# Patient Record
Sex: FEMALE | Race: ASIAN | Employment: OTHER | URBAN - METROPOLITAN AREA
[De-identification: names, ages, dates, MRNs, and addresses within clinical notes are randomized per-mention and may not be internally consistent; named-entity substitution may affect disease eponyms.]

---

## 2017-01-16 ENCOUNTER — OFFICE VISIT (OUTPATIENT)
Dept: FAMILY MEDICINE | Facility: CLINIC | Age: 67
End: 2017-01-16

## 2017-01-16 VITALS
WEIGHT: 128.8 LBS | BODY MASS INDEX: 27.79 KG/M2 | RESPIRATION RATE: 16 BRPM | HEART RATE: 53 BPM | HEIGHT: 57 IN | OXYGEN SATURATION: 99 % | DIASTOLIC BLOOD PRESSURE: 91 MMHG | SYSTOLIC BLOOD PRESSURE: 165 MMHG | TEMPERATURE: 97.6 F

## 2017-01-16 DIAGNOSIS — R00.2 PALPITATIONS: Primary | ICD-10-CM

## 2017-01-16 DIAGNOSIS — R03.0 ELEVATED BLOOD PRESSURE READING WITHOUT DIAGNOSIS OF HYPERTENSION: ICD-10-CM

## 2017-01-16 DIAGNOSIS — R00.1 BRADYCARDIA: ICD-10-CM

## 2017-01-16 LAB
ANION GAP SERPL CALCULATED.3IONS-SCNC: 11 MMOL/L (ref 5–18)
BUN SERPL-MCNC: 14 MG/DL (ref 8–22)
CALCIUM SERPL-MCNC: 9.7 MG/DL (ref 8.5–10.5)
CHLORIDE SERPL-SCNC: 107 MMOL/L (ref 98–107)
CO2 SERPL-SCNC: 25 MMOL/L (ref 22–31)
CREAT SERPL-MCNC: 0.82 MG/DL (ref 0.6–1.1)
GLUCOSE SERPL-MCNC: 91 MG/DL (ref 70–125)
MAGNESIUM SERPL-MCNC: 2.2 MG/DL (ref 1.8–2.6)
POTASSIUM SERPL-SCNC: 4.4 MMOL/L (ref 3.5–5)
SODIUM SERPL-SCNC: 143 MMOL/L (ref 136–145)
TSH SERPL DL<=0.05 MIU/L-ACNC: 2 UIU/ML (ref 0.3–5)

## 2017-01-16 NOTE — PROGRESS NOTES
"SUBJECTIVE:  This is a 66-year-old English and Hmong-speaking female who presents with an episode of \"racing heart\" last Friday evening.  Two days prior to today's visit she experienced 30 minutes of heart racing, which prompted her evaluation today.  She describes being in her usual state of good health all day on Friday.  On Friday evening  she met with her 2 sisters at her sister's home in [place] for a bible study and pray session which is part of their typical routine.  She did nothing unusual during the day.  She ate a light evening meal of the spring rolls and water.  She has had no caffeine during the course of the day.  She went home late Friday evening to her home in [place], when she got home she went to bed was in bed briefly and found her feet were cold so got out of bed and walked a few steps to the dresser to put his socks on and got back into bed and pulled the covers up.  She then felt her heart begin to \"pound\" and race.  She did not measure her heart rate.  She put her hand on her chest and felt it was going \"fast\" and \"hard.\"  She began praying and letting god know that if it was her time to die that she was ready but otherwise to help her.  She noticed some tightening sensation at the back of her neck during these symptoms, but otherwise no symptoms.  She was not short of breath.  She had no radiating pain.  No jaw pain and no dizziness, lightheadedness or vertigo.  She stayed in bed for approximately 30 minutes.  She drifted off to sleep.  She cannot recall whether the symptoms were completely gone before she fell asleep or not.  When she woke up she felt perfectly fine.  She has not had symptoms since that time.  She has not had similar symptoms in the past.  She has been able to go about her daily activities over the weekend without any difficulty.  She typically can do as much as she wants physically only limited by some \"leg stiffness\" which happens after she climbs up more than 1 flight " or 2 of stairs.  She does not get short of breath, dizzy, lightheaded or chest pain with physical exertion.  She has not measured her heart rate in the past.   REVIEW OF SYSTEMS:  She has not had recent illness.  She has no respiratory symptoms, no headache or visual changes, no stomach upset, bowel changes, no urine changes.  No bleeding or bruising.  No lumps or bumps.  No muscle aches.  The remainder of the review of systems as outlined above.   SOCIAL HISTORY:  She does not drink alcohol.  She does not smoke.   PHYSICAL EXAMINATION:   VITAL SIGNS:  As above with her blood pressure above goal.  (in reviewing past blood pressures is significantly lower in the 115-120 range systolic).   HEENT:  Head is normocephalic and atraumatic.  Eyes, sclerae are nonicteric, noninjected.  Extraocular movements are intact.  Tympanic membranes are normal with normal bony and light landmarks.  Moist mucous membranes.   NECK:  Supple without lymphadenopathy.   CARDIOVASCULAR:  Regular rate and rhythm without murmur.   LUNGS:  Clear to auscultation bilaterally.   ABDOMEN:  Soft, nontender.   EXTREMITIES:  Free of edema.   NEUROLOGIC:  Cranial nerves II-XII are grossly intact.  She ambulates about the room normally with a normal gait.   ASSESSMENT AND PLAN:   1.  Palpitations, 2 days ago.  She is currently asymptomatic.  EKG today shows a sinus bradycardia at 49 beats per minute, otherwise normal.  Her heart rate varied between 50 and 59 during our visit today.  No clear etiology of her palpitations last Friday or her bradycardia today.  I have sent a basic metabolic profile, magnesium and TSH.  Her bradycardia is likely asymptomatic and may not be related.  Will await laboratory evaluation.  I have also asked her to take note of any symptoms she may not have noticed previously when she does physical exertion, and to try to measure her heart rate if she has any recurrence of the racing heart.  We will discuss any new or recurrence of  symptoms when we discuss lab results by phone, likely in the next 48 hours.   2.  Hypertensive blood pressure readings without diagnosis of hypertension:  I have recommended she check her blood pressure twice a week over the next 2-3 weeks.  If her blood pressure systolic  greater than 140 or diastolic greater than 90 I recommend she call with her readings.   3.  Health care maintenance:  She had a normal Pap smear in 04/2016 and a mammogram documented in 2013.  Recommend a repeat mammogram if it has not been done more recently than 2015.  In reviewing her chart, I cannot find documented colon cancer screening.  She does have a DEXA scan order on file.

## 2017-01-16 NOTE — PATIENT INSTRUCTIONS
Your medication list is printed, please keep this with you, it is helpful to bring this current list to any other medical appointments, the emergency room or hospital.    If you had lab testing today and your results are reassuring or normal they will be be mailed to you within 7 days.     If the lab tests need quick action we will call you with the results.   The phone number we will call with results is # [phone] (home) . If this is not the best number please call our clinic and change the number.    If you need any refills please call your pharmacy and they will contact us.    If you have any further concerns or wish to schedule another appointment you must call our office during normal business hours  [phone] (8-5:00 M-F)  If you have urgent medical questions that cannot wait  you may also call [phone] at any time of day.  If you have a medical emergency please call [phone].    Thank you for coming to [facility].

## 2017-01-16 NOTE — MR AVS SNAPSHOT
After Visit Summary   1/16/2017    Sona Sanders    MRN: [id number]           Patient Information     Date Of Birth          1950        Visit Information        Provider Department      1/16/2017 11:00 AM Igor Mary MD [facility]        Today's Diagnoses     Palpitations    -  1     Bradycardia           Care Instructions          Your medication list is printed, please keep this with you, it is helpful to bring this current list to any other medical appointments, the emergency room or hospital.    If you had lab testing today and your results are reassuring or normal they will be be mailed to you within 7 days.     If the lab tests need quick action we will call you with the results.   The phone number we will call with results is # [phone] (home) . If this is not the best number please call our clinic and change the number.    If you need any refills please call your pharmacy and they will contact us.    If you have any further concerns or wish to schedule another appointment you must call our office during normal business hours  [phone] (8-5:00 M-F)  If you have urgent medical questions that cannot wait  you may also call [phone] at any time of day.  If you have a medical emergency please call [phone].    Thank you for coming to [facility].          Follow-ups after your visit        Who to contact     Please call your clinic at [phone] to:    Ask questions about your health    Make or cancel appointments    Discuss your medicines    Learn about your test results    Speak to your doctor   If you have compliments or concerns about an experience at your clinic, or if you wish to file a complaint, please contact [facility] Patient Relations at [phone] or email us at [email or web address]         Additional Information About Your Visit        Care EveryWhere ID     This is your Care EveryWhere ID. This could be used  "by other organizations to access your [place] medical records  [id number]        Your Vitals Were     Pulse Temperature Respirations Height BMI (Body Mass Index) Pulse Oximetry    53 97.6  F (36.4  C) (Oral) 16 4' 8.5\" (143.5 cm) 28.37 kg/m2 99%       Blood Pressure from Last 3 Encounters:   01/16/17 165/91   06/27/16 128/82   04/18/16 152/91    Weight from Last 3 Encounters:   01/16/17 128 lb 12.8 oz (58.423 kg)   06/27/16 128 lb (58.06 kg)   04/18/16 130 lb 6 oz (59.138 kg)              We Performed the Following     Basic Metabolic Profile ([facility]) - Results > 1 hr     EKG 12-lead complete w/read - Clinics     Magnesium ([facility])     Thyroid [place] ([facility])        Primary Care Provider Office Phone # Fax #    Renetta Pamela Crowell [phone]       [facility] [address] [place] [address]        Thank you!     Thank you for choosing [facility]  for your care. Our goal is always to provide you with excellent care. Hearing back from our patients is one way we can continue to improve our services. Please take a few minutes to complete the written survey that you may receive in the mail after your visit with us. Thank you!             Your Updated Medication List - Protect others around you: Learn how to safely use, store and throw away your medicines at www.disposemymeds.org.          This list is accurate as of: 1/16/17 12:17 PM.  Always use your most recent med list.                   Brand Name Dispense Instructions for use    ZANTAC PO      Take 1 tablet by mouth daily as needed         "

## 2017-01-16 NOTE — Clinical Note
January 17, 2017      Sona Sanders  [address] [address] [place] [address]        Dear Hope,    Your thyroid hormone test, blood salts, magnesium level, and kidney function are all NORMAL.  Return to clinic if you have any other episodes of heart racing or other new symptoms.    Please see below for your test results.    Resulted Orders   Thyroid [place] ([facility])   Result Value Ref Range    TSH 2.00 0.30 - 5.00 uIU/mL    Narrative    Test performed by:  [facility]  [address] [place] [address]   Basic Metabolic Profile ([facility]) - Results > 1 hr   Result Value Ref Range    Sodium 143 136 - 145 mmol/L    Potassium 4.4 3.5 - 5.0 mmol/L    Chloride 107 98 - 107 mmol/L    CO2, Total 25 22 - 31 mmol/L    Anion Gap 11 5 - 18 mmol/L    Glucose 91 70 - 125 mg/dL    Calcium 9.7 8.5 - 10.5 mg/dL    Urea Nitrogen 14 8 - 22 mg/dL    Creatinine 0.82 0.60 - 1.10 mg/dL    GFR Estimate If Black >60 >60 mL/min/1.73m2    GFR Estimate >60 >60 mL/min/1.73m2    Narrative    Test performed by:  [facility] [address] [place] [address]  Fasting Glucose reference range is 70-99 mg/dL per  American Diabetes Association (ADA) guidelines.   Magnesium ([facility])   Result Value Ref Range    Magnesium 2.2 1.8 - 2.6 mg/dL    Narrative    Test performed by:  [facility] [address] [place] [address]       If you have any questions, please call the clinic to make an appointment.    Sincerely,    Igor Mary MD

## 2017-01-17 NOTE — PROGRESS NOTES
Quick Note:    Please call results and recommendations  Your thyroid hormone test, blood salts, magnesium level, and kidney function are all NORMAL.  Return to clinic if you have any other episodes of heart racing or other new symptoms.  ______

## 2018-06-22 ENCOUNTER — OFFICE VISIT (OUTPATIENT)
Dept: FAMILY MEDICINE | Facility: CLINIC | Age: 68
End: 2018-06-22
Payer: MEDICARE

## 2018-06-22 VITALS
HEIGHT: 58 IN | HEART RATE: 54 BPM | TEMPERATURE: 97.6 F | DIASTOLIC BLOOD PRESSURE: 91 MMHG | OXYGEN SATURATION: 99 % | RESPIRATION RATE: 18 BRPM | SYSTOLIC BLOOD PRESSURE: 175 MMHG | BODY MASS INDEX: 27.54 KG/M2 | WEIGHT: 131.2 LBS

## 2018-06-22 DIAGNOSIS — Z71.84 TRAVEL ADVICE ENCOUNTER: ICD-10-CM

## 2018-06-22 DIAGNOSIS — Z63.6 CAREGIVER BURDEN: ICD-10-CM

## 2018-06-22 DIAGNOSIS — K21.9 GASTROESOPHAGEAL REFLUX DISEASE WITHOUT ESOPHAGITIS: Primary | ICD-10-CM

## 2018-06-22 RX ORDER — DOXYCYCLINE 100 MG/1
100 CAPSULE ORAL DAILY
Qty: 50 CAPSULE | Refills: 0 | Status: SHIPPED | OUTPATIENT
Start: 2018-06-22 | End: 2019-05-24

## 2018-06-22 SDOH — SOCIAL STABILITY - SOCIAL INSECURITY: DEPENDENT RELATIVE NEEDING CARE AT HOME: Z63.6

## 2018-06-22 NOTE — MR AVS SNAPSHOT
"              After Visit Summary   6/22/2018    Sona Sanders    MRN: [id number]           Patient Information     Date Of Birth          1950        Visit Information        Provider Department      6/22/2018 9:40 AM Renetta Crowell MD [facility]        Today's Diagnoses     Gastroesophageal reflux disease without esophagitis    -  1    Travel advice encounter        Caregiver burden           Follow-ups after your visit        Who to contact     Please call your clinic at [phone] to:    Ask questions about your health    Make or cancel appointments    Discuss your medicines    Learn about your test results    Speak to your doctor            Additional Information About Your Visit        Care EveryWhere ID     This is your Care EveryWhere ID. This could be used by other organizations to access your [place] medical records  [id number]        Your Vitals Were     Pulse Temperature Respirations Height Pulse Oximetry BMI (Body Mass Index)    54 97.6  F (36.4  C) (Oral) 18 4' 10\" (147.3 cm) 99% 27.42 kg/m2       Blood Pressure from Last 3 Encounters:   06/22/18 (!) 175/91   01/16/17 (!) 165/91   06/27/16 128/82    Weight from Last 3 Encounters:   06/22/18 131 lb 3.2 oz (59.5 kg)   01/16/17 128 lb 12.8 oz (58.4 kg)   06/27/16 128 lb (58.1 kg)              We Performed the Following     ADMIN VACCINE, INITIAL     HEPATITIS A VACCINE ADULT IM     SCREENING, VISUAL ACUITY, QUANTITATIVE, BILAT          Today's Medication Changes          These changes are accurate as of 6/22/18 11:59 PM.  If you have any questions, ask your nurse or doctor.               Start taking these medicines.        Dose/Directions    doxycycline 100 MG capsule   Commonly known as:  VIBRAMYCIN   Used for:  Travel advice encounter   Started by:  Renetta Crowell MD        Dose:  100 mg   Take 1 capsule (100 mg) by mouth daily STart medicine 2 days before travel and continue until all pills are gone.   Quantity:  " 50 capsule   Refills:  0       typhoid CR capsule   Commonly known as:  VIVOTIF   Used for:  Travel advice encounter   Started by:  Renetta Crowell MD        Dose:  1 capsule   Take 1 capsule by mouth every other day Start one week prior to your trip   Quantity:  4 capsule   Refills:  0         These medicines have changed or have updated prescriptions.        Dose/Directions    * ZANTAC PO   This may have changed:  Another medication with the same name was added. Make sure you understand how and when to take each.   Changed by:  Renetta Crowell MD        Dose:  1 tablet   Take 1 tablet by mouth daily as needed   Refills:  0       * ranitidine 150 MG tablet   Commonly known as:  ZANTAC   This may have changed:  You were already taking a medication with the same name, and this prescription was added. Make sure you understand how and when to take each.   Used for:  Gastroesophageal reflux disease without esophagitis   Changed by:  Renetta Crowell MD        Dose:  150 mg   Take 1 tablet (150 mg) by mouth 2 times daily as needed for heartburn   Quantity:  60 tablet   Refills:  1       * Notice:  This list has 2 medication(s) that are the same as other medications prescribed for you. Read the directions carefully, and ask your doctor or other care provider to review them with you.         Where to get your medicines      These medications [facility] to [facility] [id number] [place] [address] [facility]  [address] [facility] [address]    Hours:  24-hours Phone:  [phone]     ranitidine 150 MG tablet         Some of these will need a paper prescription and others can be bought over the counter.  Ask your nurse if you have questions.     Bring a paper prescription for each of these medications     doxycycline 100 MG capsule    typhoid CR capsule                Primary Care Provider Office Phone # Fax #    Renetta Santiago  MD Socrates [phone]       [facility] [address] [facility] [address]        Equal Access to Services     ANDI LINDQUIST : Hadii aad ku hadsilasyuan Marie, geri torres, wonclarence kaalmada harper, tian philipin hayaabarbara upadriana quiroga sanya parker. So [facility] [phone].    ATENCIÓN: Si habla español, tiene a charles disposición servicios gratuitos de asistencia lingüística. Llame al [phone].    We comply with applicable federal civil rights laws and [place] laws. We do not discriminate on the basis of race, color, national origin, age, disability, sex, sexual orientation, or gender identity.            Thank you!     Thank you for choosing [facility]  for your care. Our goal is always to provide you with excellent care. Hearing back from our patients is one way we can continue to improve our services. Please take a few minutes to complete the written survey that you may receive in the mail after your visit with us. Thank you!             Your Updated Medication List - Protect others around you: Learn how to safely use, store and throw away your medicines at www.disposemymeds.org.          This list is accurate as of 6/22/18 11:59 PM.  Always use your most recent med list.                   Brand Name Dispense Instructions for use Diagnosis    doxycycline 100 MG capsule    VIBRAMYCIN    50 capsule    Take 1 capsule (100 mg) by mouth daily STart medicine 2 days before travel and continue until all pills are gone.    Travel advice encounter       OMEPRAZOLE PO      Take 20 mg by mouth        typhoid CR capsule    VIVOTIF    4 capsule    Take 1 capsule by mouth every other day Start one week prior to your trip    Travel advice encounter       * ZANTAC PO      Take 1 tablet by mouth daily as needed        * ranitidine 150 MG tablet    ZANTAC    60 tablet    Take 1 tablet (150 mg) by mouth 2 times daily as needed for heartburn    Gastroesophageal reflux disease without esophagitis       *  Notice:  This list has 2 medication(s) that are the same as other medications prescribed for you. Read the directions carefully, and ask your doctor or other care provider to review them with you.

## 2018-06-22 NOTE — NURSING NOTE
Due to patient being non-English speaking/uses sign language, an  was used for this visit. Only for face-to-face interpretation by an external agency, date and length of interpretation can be found on the scanned worksheet.     name: Yasmine   Agency: [employer] Abdullah[employer]  Language: Francomady   Telephone number: [phone]  Type of interpretation: Face-to-face, spoken     VISION   No corrective lenses  Tool used: [place]   Right eye:       20/30  Left eye:          10/40 (20/80)  Visual Acuity: REFER  H Plus Lens Screening: Pass

## 2018-06-22 NOTE — PROGRESS NOTES
HPI:       Sona Sanders is a 68 year old  female who presents to address the following concerns:    Travel to [facility]  -will be traveling to [facility] [place] in July  -plan is for trip that starts in [facility] up to [facility] and short visit to [facility]  -does not plan to spend extensive amounts of time in rural areas  -will be staying in hotels  -last trip to [facility] 2012  -patient had no prophy at this visit    Caregiver fatigue:  - had a stroke in 1994  -weak on the left side  -ambulates with cane  -Husbands Gabriel Sanders (my patient bt I have not seen since 2016, recently saw Homar)  -needs some resources for her   -income level is too high for PCA and too low to pay for help   -will not go to an adult daycenter because he is not as able bodied as other seniors  -the couple has children, live with their son  -income source is disability gets $1000/mo, Sona gets $700/mo with social security  -the couple is hoping for someone to come in to the home         PMHX:     Patient Active Problem List   Diagnosis     Caregiver burden     Cervical cancer screening     Advance care planning     Elevated blood pressure reading without diagnosis of hypertension     Bradycardia       Current Outpatient Prescriptions   Medication Sig Dispense Refill     OMEPRAZOLE PO Take 20 mg by mouth       ranitidine (ZANTAC) 150 MG tablet Take 1 tablet (150 mg) by mouth 2 times daily as needed for heartburn 60 tablet 1     Ranitidine HCl (ZANTAC PO) Take 1 tablet by mouth daily as needed            Allergies   Allergen Reactions     No Known Allergies        No results found for this or any previous visit (from the past 24 hour(s)).    Current Outpatient Prescriptions   Medication     OMEPRAZOLE PO     ranitidine (ZANTAC) 150 MG tablet     Ranitidine HCl (ZANTAC PO)     No current facility-administered medications for this visit.               Review of Systems:   ROS as described above.  Denies F/S/C/N/V/SOB/CP           "Physical Exam:     Vitals:    06/22/18 1001 06/22/18 1009   BP: (!) 177/92 (!) 175/91   Pulse: 54    Resp: 18    Temp: 97.6  F (36.4  C)    TempSrc: Oral    SpO2: 99%    Weight: 131 lb 3.2 oz (59.5 kg)    Height: 4' 10\" (147.3 cm)      Body mass index is 27.42 kg/(m^2).    GEN: patient sitting comfortably in NAD  HEEN: Head is atraumatic, normocephalic, eyes anicteric, mucous membranes moist  CV: RRR w/o M/R/G  PULM: CTAB without w/r/r  ABD: soft, nontender, bowel sounds present  NEURO: Alert and oriented x3.  No focal motor abnormalities.  Face symmetric.  PSYCH: appropriate  SKIN: No rashes, bruising, or other lesions      Assessment and Plan     1. Gastroesophageal reflux disease without esophagitis  - SCREENING, VISUAL ACUITY, QUANTITATIVE, BILAT  - ranitidine (ZANTAC) 150 MG tablet; Take 1 tablet (150 mg) by mouth 2 times daily as needed for heartburn  Dispense: 60 tablet; Refill: 1    2. Travel advice encounter[facility]  Travel plans reviewed.  Educated regarding mosqito netting, water safety, use of medications.  Patient in agreement.  Reviewed recommendations for J. Encephalitis and do not believe risks high enough for benefit and accepted risk of vaccination.  Patient in agreement.   - doxycycline (VIBRAMYCIN) 100 MG capsule; Take 1 capsule (100 mg) by mouth daily STart medicine 2 days before travel and continue until all pills are gone.  Dispense: 50 capsule; Refill: 0  - typhoid (VIVOTIF) CR capsule; Take 1 capsule by mouth every other day Start one week prior to your trip  Dispense: 4 capsule; Refill: 0  - HEPATITIS A VACCINE ADULT IM  - ADMIN VACCINE, INITIAL    3 Caregiver fatigue:  -discussed extensively with patient today  -will review with Souk  -recommend co-visits with  next visit (after [place])  -patient in agreement    Elelvated BP:  -? whitecoat  -reassess next visit    F/u 9/2018 to complete Wellness visit    Options for treatment and follow-up care were reviewed " with the patient and/or guardian. Sona Sanders and/or guardian engaged in the decision making process and verbalized understanding of the options discussed and agreed with the final plan.    Renetta Crowell MD

## 2019-03-25 ENCOUNTER — RECORDS - HEALTHEAST (OUTPATIENT)
Dept: ADMINISTRATIVE | Facility: OTHER | Age: 69
End: 2019-03-25

## 2019-03-25 ENCOUNTER — OFFICE VISIT (OUTPATIENT)
Dept: FAMILY MEDICINE | Facility: CLINIC | Age: 69
End: 2019-03-25
Payer: MEDICARE

## 2019-03-25 VITALS
DIASTOLIC BLOOD PRESSURE: 89 MMHG | RESPIRATION RATE: 18 BRPM | OXYGEN SATURATION: 96 % | HEART RATE: 60 BPM | TEMPERATURE: 98 F | SYSTOLIC BLOOD PRESSURE: 162 MMHG | WEIGHT: 138 LBS | BODY MASS INDEX: 28.84 KG/M2

## 2019-03-25 DIAGNOSIS — T14.8XXA BRUISING: ICD-10-CM

## 2019-03-25 DIAGNOSIS — K21.00 GASTROESOPHAGEAL REFLUX DISEASE WITH ESOPHAGITIS: ICD-10-CM

## 2019-03-25 DIAGNOSIS — L85.3 DRY SKIN: ICD-10-CM

## 2019-03-25 DIAGNOSIS — R10.11 RIGHT UPPER QUADRANT ABDOMINAL PAIN: Primary | ICD-10-CM

## 2019-03-25 RX ORDER — AMMONIUM LACTATE 12 G/100G
CREAM TOPICAL 2 TIMES DAILY
Qty: 140 G | Refills: 3 | Status: SHIPPED | OUTPATIENT
Start: 2019-03-25 | End: 2019-05-24

## 2019-03-25 NOTE — LETTER
April 16, 2019      Sona Sanders  [address] [address] [place] [address]        Dear Sona,    The ultrasound found gallstones.  This may be the cause of the pain. We will refer her to surgery for further evaluation.    Please see below for your test results.    No results found from the In Basket message.    If you have any questions, please call the clinic to make an appointment.    Sincerely,    Efrain Rivera MD

## 2019-03-25 NOTE — NURSING NOTE
Due to patient being non-English speaking/uses sign language, an  was used for this visit. Only for face-to-face interpretation by an external agency, date and length of interpretation can be found on the scanned worksheet.     name: Isis Luu  Agency: [employer]rin Abdullahi  Language: Hmmady   Telephone number: -  Type of interpretation: Face-to-face, spoken     JENNIFER Cormier

## 2019-03-25 NOTE — PROGRESS NOTES
HPI:       Sona Sanders is a 69 year old  female who presents for evaluation of bruising.   Patient was concerned that her skin was getting dry, and was bruising easily. Patient had her friend doing a vigorous facial massage using pinching technique. This resulted in two small bruises on the left cheek. Patient was concerned that this might be related to previous bruise that she sustained several months ago. No history suggestive of domestics abuse.     Patient has been putting Vaseline on her cheeks because they are so dry.  She is worried that they are still dry.  She has accompanied her  today who is being seen for urinary incontinence.   History of GERD - was treated with Zantac from Dr. Crowell last summer. Patient did not like th medication she was started on at that time, and wanted the one she had previously.      Patient reports mild epigastric pain occurring for one to two hour after eating.  Feels bloated.  Located in upper to upper right epigastrium.  No vomiting. Not associated with reflux.              PMHX:   Current Medications:   Current Outpatient Medications   Medication Sig Dispense Refill     doxycycline (VIBRAMYCIN) 100 MG capsule Take 1 capsule (100 mg) by mouth daily STart medicine 2 days before travel and continue until all pills are gone. 50 capsule 0     OMEPRAZOLE PO Take 20 mg by mouth       ranitidine (ZANTAC) 150 MG tablet Take 1 tablet (150 mg) by mouth 2 times daily as needed for heartburn 60 tablet 1     Ranitidine HCl (ZANTAC PO) Take 1 tablet by mouth daily as needed       typhoid (VIVOTIF) CR capsule Take 1 capsule by mouth every other day Start one week prior to your trip 4 capsule 0       Existing Problems  Patient Active Problem List   Diagnosis     Caregiver burden     Cervical cancer screening     Advance care planning     Elevated blood pressure reading without diagnosis of hypertension     Bradycardia       Allergies:  Allergies   Allergen Reactions     No Known  Allergies        Previous labs:  Lab Results   Component Value Date    HGB 13.7 08/09/2013    HCT 41.0 08/09/2013    CHOL 243 (H) 06/27/2016    TRIG 136.0 08/09/2013    HDL 61 06/27/2016     01/16/2017    BUN 14 01/16/2017    CO2 31.0 08/09/2013               Review of Systems:    CONSTITUTIONAL: no fatigue, no unexpected change in weight  SKIN: as above  EYES: no acute vision problems or changes  ENT: no ear problems, no mouth problems, no throat problems  RESP: no significant cough, no shortness of breath  CV: no chest pain, no palpitations, no new or worsening peripheral edema  GI: no nausea, no vomiting, no constipation, no diarrhea          Physical Exam:     Vitals:    03/25/19 1508 03/25/19 1510   BP: (!) 180/99 162/89   Pulse: 60    Resp: 18    Temp: 98  F (36.7  C)    TempSrc: Oral    SpO2: 96%    Weight: 62.6 kg (138 lb)      Body mass index is 28.84 kg/m .    GENERAL:alert, well hydrated, no distress  EYES: Eyes grossly normal to inspection, extraocular movements - intact, and PERRL  HENT: ear canals- normal; TMs- normal; Nose- normal; Mouth- no ulcers, no lesions  Left cheek - two 1-2 cm pinch marks. Small ecchymosis.  No other lesion, no abrasion, no extension of bruising.    NECK: no tenderness, no adenopathy, no asymmetry, no masses, no stiffness; thyroid- normal to palpation  RESP: lungs clear to auscultation - no rales, no rhonchi, no wheezes  CV: regular rates and rhythm, normal S1 S2, no S3 or S4 and no murmur, no click or rub -               Labs and Procedures     Office Visit on 01/16/2017   Component Date Value Ref Range Status     TSH 01/16/2017 2.00  0.30 - 5.00 uIU/mL Final     Sodium 01/16/2017 143  136 - 145 mmol/L Final     Potassium 01/16/2017 4.4  3.5 - 5.0 mmol/L Final     Chloride 01/16/2017 107  98 - 107 mmol/L Final     CO2, Total 01/16/2017 25  22 - 31 mmol/L Final     Anion Gap 01/16/2017 11  5 - 18 mmol/L Final     Glucose 01/16/2017 91  70 - 125 mg/dL Final     Calcium  01/16/2017 9.7  8.5 - 10.5 mg/dL Final     Urea Nitrogen 01/16/2017 14  8 - 22 mg/dL Final     Creatinine 01/16/2017 0.82  0.60 - 1.10 mg/dL Final     GFR Estimate If Black 01/16/2017 >60  >60 mL/min/1.73m2 Final     GFR Estimate 01/16/2017 >60  >60 mL/min/1.73m2 Final     Magnesium 01/16/2017 2.2  1.8 - 2.6 mg/dL Final              Assessment and Plan     1.No relationship between current bruising and previous bruise could be identified.  No abnormality found. .    2. Advised patient to stop the Vaseline to face. Provided with lac hydrin cream instead.   3.  GERD- patient would like a refill of previous medication, not the zantac. Provide refill for omeprazole.   4. Abdominal discomfort - Consistent with chronic cholecystitis.  Will refer patient for abdominal US for possible cholelithiasis.      Options for treatment and follow-up care were reviewed with the patient and/or guardian. Hope Marilyn and/or guardian engaged in the decision making process and verbalized understanding of the options discussed and agreed with the final plan.    Efrain Rivera MD

## 2019-03-27 NOTE — PATIENT INSTRUCTIONS
Referral for ( TEST )  :      US Abdomen Complete   LOCATION/PLACE/Provider :    [facility]   DATE & TIME :     3- at 8:00am  PHONE :     [phone]  FAX :     [phone]  Appointment made by clinic staff/:    Diana

## 2019-03-30 ENCOUNTER — HOSPITAL ENCOUNTER (OUTPATIENT)
Dept: ULTRASOUND IMAGING | Facility: HOSPITAL | Age: 69
Discharge: HOME | End: 2019-03-30
Attending: FAMILY MEDICINE

## 2019-03-30 ENCOUNTER — COMMUNICATION - HEALTHEAST (OUTPATIENT)
Dept: TELEHEALTH | Facility: CLINIC | Age: 69
End: 2019-03-30

## 2019-03-30 DIAGNOSIS — R10.11 RUQ ABDOMINAL PAIN: ICD-10-CM

## 2019-03-30 DIAGNOSIS — K21.00 GASTRO-ESOPHAGEAL REFLUX DISEASE WITH ESOPHAGITIS: ICD-10-CM

## 2019-04-12 ENCOUNTER — RECORDS - HEALTHEAST (OUTPATIENT)
Dept: ADMINISTRATIVE | Facility: OTHER | Age: 69
End: 2019-04-12

## 2019-04-15 ENCOUNTER — TELEPHONE (OUTPATIENT)
Dept: FAMILY MEDICINE | Facility: CLINIC | Age: 69
End: 2019-04-15

## 2019-04-15 NOTE — RESULT ENCOUNTER NOTE
Please call patient and send results letter  The ultrasound found gallstones.  This may be the cause of the pain. We will refer her to surgery for further evaluation.

## 2019-04-16 NOTE — TELEPHONE ENCOUNTER
Patient called and want to know the result of the ultrasound in 03-30-19 and I did review the result with DR Rivera with gallstone and I did informed patient with this and will referral patient to the HE surgery.Patient state that she will discuss with her  and get back to me.

## 2019-05-24 ENCOUNTER — OFFICE VISIT (OUTPATIENT)
Dept: FAMILY MEDICINE | Facility: CLINIC | Age: 69
End: 2019-05-24
Payer: MEDICARE

## 2019-05-24 VITALS
BODY MASS INDEX: 28.48 KG/M2 | OXYGEN SATURATION: 98 % | RESPIRATION RATE: 18 BRPM | HEIGHT: 57 IN | HEART RATE: 63 BPM | SYSTOLIC BLOOD PRESSURE: 175 MMHG | WEIGHT: 132 LBS | TEMPERATURE: 98.1 F | DIASTOLIC BLOOD PRESSURE: 122 MMHG

## 2019-05-24 DIAGNOSIS — K21.00 GASTROESOPHAGEAL REFLUX DISEASE WITH ESOPHAGITIS: ICD-10-CM

## 2019-05-24 DIAGNOSIS — L98.9 SKIN LESION: Primary | ICD-10-CM

## 2019-05-24 DIAGNOSIS — R10.11 RIGHT UPPER QUADRANT ABDOMINAL PAIN: ICD-10-CM

## 2019-05-24 DIAGNOSIS — I10 ESSENTIAL HYPERTENSION: ICD-10-CM

## 2019-05-24 LAB
BUN SERPL-MCNC: 16 MG/DL (ref 7–30)
CALCIUM SERPL-MCNC: 9.5 MG/DL (ref 8.5–10.4)
CHLORIDE SERPLBLD-SCNC: 106 MMOL/L (ref 94–109)
CO2 SERPL-SCNC: 31 MMOL/L (ref 20–32)
CREAT SERPL-MCNC: 0.9 MG/DL (ref 0.6–1.3)
EGFR CALCULATED (BLACK REFERENCE): 80.1 ML/MIN
EGFR CALCULATED (NON BLACK REFERENCE): 66.2 ML/MIN
GLUCOSE SERPL-MCNC: 92 MG/DL (ref 60–109)
POTASSIUM SERPL-SCNC: 4 MMOL/L (ref 3.4–5.3)
SODIUM SERPL-SCNC: 144 MMOL/L (ref 133–144)

## 2019-05-24 RX ORDER — LISINOPRIL 10 MG/1
10 TABLET ORAL DAILY
Qty: 45 TABLET | Refills: 0 | Status: SHIPPED | OUTPATIENT
Start: 2019-05-24 | End: 2019-05-31

## 2019-05-24 ASSESSMENT — MIFFLIN-ST. JEOR: SCORE: 1010.25

## 2019-05-24 NOTE — PROGRESS NOTES
HPI:       Sona Sanders is a 69 year old  female who presents to address the following concerns:    Rash  Patient reporting rash face and neck  Seen previously for rash and prescribed ammonium lactate  Patient has been using but this has not helped    Caregiver reji  Primary caregiver for grandchild (2) as well as son with hemiplegia following stroke.    Experiencing sx caregiver fatigue, would like a break    Blood pressure:  History of elevated blood pressure over that past year  Not on medication for blood pressure  Last BMP 1/16/17: WNL    Lesion face:  -reporting lesion right cheek  -initially scabbed  -now growing and looks like a blister         PMHX:     Patient Active Problem List   Diagnosis     Caregiver burden     Cervical cancer screening     Advance care planning     Elevated blood pressure reading without diagnosis of hypertension     Bradycardia       Current Outpatient Medications   Medication Sig Dispense Refill     ammonium lactate (AMLACTIN) 12 % external cream Apply topically 2 times daily 140 g 3     doxycycline (VIBRAMYCIN) 100 MG capsule Take 1 capsule (100 mg) by mouth daily STart medicine 2 days before travel and continue until all pills are gone. 50 capsule 0     omeprazole (PRILOSEC) 20 MG DR capsule Take 1 capsule (20 mg) by mouth daily 30 capsule 3     ranitidine (ZANTAC) 150 MG tablet Take 1 tablet (150 mg) by mouth 2 times daily as needed for heartburn 60 tablet 1     Ranitidine HCl (ZANTAC PO) Take 1 tablet by mouth daily as needed       typhoid (VIVOTIF) CR capsule Take 1 capsule by mouth every other day Start one week prior to your trip 4 capsule 0          Allergies   Allergen Reactions     No Known Allergies        No results found for this or any previous visit (from the past 24 hour(s)).    Current Outpatient Medications   Medication     ammonium lactate (AMLACTIN) 12 % external cream     doxycycline (VIBRAMYCIN) 100 MG capsule     omeprazole (PRILOSEC) 20 MG DR capsule  "    ranitidine (ZANTAC) 150 MG tablet     Ranitidine HCl (ZANTAC PO)     typhoid (VIVOTIF) CR capsule     No current facility-administered medications for this visit.               Review of Systems:   ROS as described above.  Denies F/S/C/N/V/SOB/CP          Physical Exam:     BP (!) 175/122   Pulse 63   Temp 98.1  F (36.7  C) (Oral)   Resp 18   Ht 1.46 m (4' 9.48\")   Wt 59.9 kg (132 lb)   SpO2 98%   BMI 28.09 kg/m      GEN: patient sitting comfortably in NAD  HEEN: Head is atraumatic, normocephalic, eyes anicteric, mucous membranes moist  CV: RRR w/o M/R/G  PULM: CTAB without w/r/r  ABD: soft, nontender, bowel sounds present  NEURO: Alert and oriented x3.  No focal motor abnormalities.  Face symmetric.  PSYCH: appropriate  SKIN: vascular raised lesion .5cmx.7cm left cheek.  Erythematous rash face and anterior chest + some diffusely dispersed 1mm pustules    Assessment and Plan     1. Skin lesion-concern for basal cell.  Prefer to have derm eval  - DERMATOLOGY REFERRAL; Future    2. Essential hypertension  - Basic Metabolic Panel ([facility])    3. Right upper quadrant abdominal pain-continue current  - omeprazole (PRILOSEC) 20 MG DR capsule; Take 1 capsule (20 mg) by mouth daily  Dispense: 90 capsule; Refill: 3    4. Gastroesophageal reflux disease with esophagitis  - omeprazole (PRILOSEC) 20 MG DR capsule; Take 1 capsule (20 mg) by mouth daily  Dispense: 90 capsule; Refill: 3    5. Rash:  -discharge ammonium lactate.  -overall appears irritated    Clinic will contact adult  about placement if possible      Options for treatment and follow-up care were reviewed with the patient and/or guardian. Hope Marilyn and/or guardian engaged in the decision making process and verbalized understanding of the options discussed and agreed with the final plan.    Renetta Crowell MD            "

## 2019-05-24 NOTE — PATIENT INSTRUCTIONS
Referral for ( TEST )  :      Dermatology   LOCATION/PLACE/Provider :    [facility]   DATE & TIME :     6- at 10;10  PHONE :     [phone]  FAX :     [phone]  Appointment made by clinic staff/:    Diana

## 2019-05-24 NOTE — NURSING NOTE
Due to patient being non-English speaking/uses sign language, an  was used for this visit. Only for face-to-face interpretation by an external agency, date and length of interpretation can be found on the scanned worksheet.     name: beto ayon   Agency: [employer]  Language: Hmong   Telephone number: [phone]  Type of interpretation: Face-to-face, spoken

## 2019-05-30 ENCOUNTER — TELEPHONE (OUTPATIENT)
Dept: FAMILY MEDICINE | Facility: CLINIC | Age: 69
End: 2019-05-30

## 2019-05-30 DIAGNOSIS — I10 HYPERTENSION, UNSPECIFIED TYPE: Primary | ICD-10-CM

## 2019-05-30 NOTE — TELEPHONE ENCOUNTER
Patient called and report that since she is taking the Lisinopril from 05/24/2019 and she has a rash all over her body and and serves itchy and she did stop the lisinopril and the rash is better.She wonder can DR Crowell switch to another blood pressure for her.

## 2019-05-31 RX ORDER — HYDROCHLOROTHIAZIDE 12.5 MG/1
25 TABLET ORAL DAILY
Qty: 30 TABLET | Refills: 0 | Status: SHIPPED | OUTPATIENT
Start: 2019-05-31 | End: 2019-07-03

## 2019-05-31 NOTE — TELEPHONE ENCOUNTER
Per Dr Crowell routing comment, alternative hydrochlorothiazide prescribed. Patient informed and educated to expect more frequent urination with taking this medication. Hope states understanding, no further questions or concerns. Will plan on following up as discussed at clinic visit in June 2019. Evelyn BURNETT

## 2019-05-31 NOTE — TELEPHONE ENCOUNTER
Patient had called and scheduled an appt in clinic with Dr Lainez to receive alternative to Lisinopril. Provider had to leave for the day. No further appts availability in clinic. I have paged Dr Crowell to review this encounter. Patient reporting allergic reaction to Lisinopril and requesting alternative bp medication. Will await for a response from Dr Crowell, if no response will page house physician.  Evelyn BURNETT

## 2019-07-03 ENCOUNTER — RECORDS - HEALTHEAST (OUTPATIENT)
Dept: ADMINISTRATIVE | Facility: OTHER | Age: 69
End: 2019-07-03

## 2019-07-03 ENCOUNTER — OFFICE VISIT (OUTPATIENT)
Dept: FAMILY MEDICINE | Facility: CLINIC | Age: 69
End: 2019-07-03
Payer: MEDICARE

## 2019-07-03 VITALS
OXYGEN SATURATION: 99 % | BODY MASS INDEX: 28.13 KG/M2 | TEMPERATURE: 97.4 F | WEIGHT: 132.2 LBS | SYSTOLIC BLOOD PRESSURE: 173 MMHG | RESPIRATION RATE: 18 BRPM | HEART RATE: 73 BPM | DIASTOLIC BLOOD PRESSURE: 84 MMHG

## 2019-07-03 DIAGNOSIS — Z13.9 SCREENING FOR CONDITION: Primary | ICD-10-CM

## 2019-07-03 DIAGNOSIS — I10 HYPERTENSION, UNSPECIFIED TYPE: ICD-10-CM

## 2019-07-03 LAB — HEMOCCULT STL QL IA: NEGATIVE

## 2019-07-03 RX ORDER — HYDROCHLOROTHIAZIDE 25 MG/1
25 TABLET ORAL DAILY
Qty: 90 TABLET | Refills: 1 | Status: SHIPPED | OUTPATIENT
Start: 2019-07-03 | End: 2019-07-15

## 2019-07-03 NOTE — PATIENT INSTRUCTIONS
Referral for Mammogram faxed to  Central Scheduling  [phone]  [phone]  Patient will be contacted to schedule appointment.    APPT: Screening mammogram   WHERE: [facility] [address], [place], [place]  WHEN: Wednesday 7/10/19 at 12:30 PM

## 2019-07-03 NOTE — PROGRESS NOTES
HPI:       Sona Sanders is a 69 year old  female who presents to address the following concerns:    Dermatology follow up:  -given a cream from dermatology and this has been ok  -does not know then     Desire for adult :  -put name on a waiting list for spot    Needs incontinence prodcuts for .  Medicare will not cover    HTN:  Out of hydrochlorothiazide  BMP last visit WNL         PMHX:     Patient Active Problem List   Diagnosis     Caregiver burden     Cervical cancer screening     Advance care planning     Elevated blood pressure reading without diagnosis of hypertension     Bradycardia       Current Outpatient Medications   Medication Sig Dispense Refill     hydrochlorothiazide (HYDRODIURIL) 12.5 MG tablet Take 2 tablets (25 mg) by mouth daily 30 tablet 0     omeprazole (PRILOSEC) 20 MG DR capsule Take 1 capsule (20 mg) by mouth daily 90 capsule 3     Ranitidine HCl (ZANTAC PO) Take 1 tablet by mouth daily as needed            Allergies   Allergen Reactions     Lisinopril      rash       No results found for this or any previous visit (from the past 24 hour(s)).    Current Outpatient Medications   Medication     hydrochlorothiazide (HYDRODIURIL) 12.5 MG tablet     omeprazole (PRILOSEC) 20 MG DR capsule     Ranitidine HCl (ZANTAC PO)     No current facility-administered medications for this visit.               Review of Systems:   ROS as described above.  Denies F/S/C/N/V/SOB/CP          Physical Exam:     Vitals:    07/03/19 1001 07/03/19 1002   BP: (!) 172/93 173/84   Pulse: 73    Resp: 18    Temp: 97.4  F (36.3  C)    SpO2: 99%    Weight: 60 kg (132 lb 3.2 oz)      Body mass index is 28.13 kg/m .    GEN: patient sitting comfortably in NAD  HEEN: Head is atraumatic, normocephalic, eyes anicteric, mucous membranes moist  CV: RRR w/o M/R/G  PULM: CTAB without w/r/r  ABD: soft, nontender, bowel sounds present  NEURO: Alert and oriented x3.  No focal motor abnormalities.  Face  symmetric.  PSYCH: appropriate  SKIN: face and chest red/irritated diffusely without discrete lesions.  Patient showed picture of face after applying sunscreen.  Demonstrated several discrete lesions across face and chest related to sunscreen application    Results for orders placed or performed in visit on 05/24/19   Basic Metabolic Panel ([facility])   Result Value Ref Range    Glucose 92.0 60.0 - 109.0 mg/dL    Urea Nitrogen 16.0 7.0 - 30.0 mg/dL    Creatinine 0.9 0.6 - 1.3 mg/dL    Sodium 144.0 133.0 - 144.0 mmol/L    Potassium 4.0 3.4 - 5.3 mmol/L    Chloride 106.0 94.0 - 109.0 mmol/L    Carbon Dioxide 31.0 20.0 - 32.0 mmol/L    Calcium 9.5 8.5 - 10.4 mg/dL    eGFR Calculated (Non Black Reference) 66.2 >60.0 mL/min    eGFR Calculated (Black Reference) 80.1 >60.0 mL/min       Assessment and Plan     Derm:  -will wait for derm letter with name and dose of topical skin medication (looks like recent rx metronidazole gel for rosacea)  -discussed use hypoallergenic soaps and avoiding dyes/perfumes  -recommend broad brimmed sunhat and protective clothing given reaction to sunscreen       HTN:  Refilled hydrochlorothiazide 25mg tabs  BMP next visit    Desire adult :  -on waiting list    HCM:  -mammogram ordered    Of note: Medicare will not cover adult diapers for  and this would be an out of pocket expense    Options for treatment and follow-up care were reviewed with the patient and/or guardian. Sona Sanders and/or guardian engaged in the decision making process and verbalized understanding of the options discussed and agreed with the final plan.    Renetta Crowell MD

## 2019-07-03 NOTE — NURSING NOTE
Due to patient being non-English speaking/uses sign language, an  was used for this visit. Only for face-to-face interpretation by an external agency, date and length of interpretation can be found on the scanned worksheet.     name: Navin Gaspar  Agency: [employer]  Language: Hmong   Telephone number: [phone]  Type of interpretation: Face-to-face, spoken   Mammo- Ok. order placed  Fit test - done

## 2019-07-10 ENCOUNTER — RECORDS - HEALTHEAST (OUTPATIENT)
Dept: MAMMOGRAPHY | Facility: CLINIC | Age: 69
End: 2019-07-10

## 2019-07-10 DIAGNOSIS — Z12.31 ENCOUNTER FOR SCREENING MAMMOGRAM FOR MALIGNANT NEOPLASM OF BREAST: ICD-10-CM

## 2019-07-15 ENCOUNTER — OFFICE VISIT (OUTPATIENT)
Dept: FAMILY MEDICINE | Facility: CLINIC | Age: 69
End: 2019-07-15
Payer: MEDICARE

## 2019-07-15 VITALS
SYSTOLIC BLOOD PRESSURE: 170 MMHG | TEMPERATURE: 97.6 F | DIASTOLIC BLOOD PRESSURE: 81 MMHG | BODY MASS INDEX: 29.34 KG/M2 | HEIGHT: 57 IN | RESPIRATION RATE: 16 BRPM | WEIGHT: 136 LBS | OXYGEN SATURATION: 100 % | HEART RATE: 57 BPM

## 2019-07-15 DIAGNOSIS — I10 ESSENTIAL HYPERTENSION: Primary | ICD-10-CM

## 2019-07-15 LAB
BUN SERPL-MCNC: 12 MG/DL (ref 7–30)
CALCIUM SERPL-MCNC: 9.4 MG/DL (ref 8.5–10.4)
CHLORIDE SERPLBLD-SCNC: 109 MMOL/L (ref 94–109)
CO2 SERPL-SCNC: 29 MMOL/L (ref 20–32)
CREAT SERPL-MCNC: 0.8 MG/DL (ref 0.6–1.3)
EGFR CALCULATED (BLACK REFERENCE): >90 ML/MIN
EGFR CALCULATED (NON BLACK REFERENCE): 75.8 ML/MIN
GLUCOSE SERPL-MCNC: 93 MG/DL (ref 60–109)
POTASSIUM SERPL-SCNC: 4.1 MMOL/L (ref 3.4–5.3)
SODIUM SERPL-SCNC: 139 MMOL/L (ref 133–144)

## 2019-07-15 RX ORDER — LOSARTAN POTASSIUM 50 MG/1
50 TABLET ORAL DAILY
Qty: 30 TABLET | Refills: 0 | Status: SHIPPED | OUTPATIENT
Start: 2019-07-15 | End: 2019-08-19

## 2019-07-15 ASSESSMENT — MIFFLIN-ST. JEOR: SCORE: 1028.39

## 2019-07-15 NOTE — LETTER
July 17, 2019      Sona Sanders  [address] [address] [place] [id number]        Dear Hope,    Your kidney function is normal.  This test will be repeated when you return in about 3 weeks for a blood pressure check on your new medication LOSARTAN 50mg daiy.    Please see below for your test results.    Resulted Orders   Basic Metabolic Panel ([facility]) - Results < 1 hr   Result Value Ref Range    Glucose 93.0 60.0 - 109.0 mg/dL    Urea Nitrogen 12.0 7.0 - 30.0 mg/dL    Creatinine 0.8 0.6 - 1.3 mg/dL    Sodium 139.0 133.0 - 144.0 mmol/L    Potassium 4.1 3.4 - 5.3 mmol/L    Chloride 109.0 94.0 - 109.0 mmol/L    Carbon Dioxide 29.0 20.0 - 32.0 mmol/L    Calcium 9.4 8.5 - 10.4 mg/dL    eGFR Calculated (Non Black Reference) 75.8 >60.0 mL/min    eGFR Calculated (Black Reference) >90 >60.0 mL/min       If you have any questions, please call the clinic to make an appointment.    Sincerely,    Igor Mary MD

## 2019-07-15 NOTE — PATIENT INSTRUCTIONS
STOP Hydrochlorothiazide    START Losartan 50mg daily for blood pressure    Return to clinic in 3 week for a kidney test and blood pressure check

## 2019-07-15 NOTE — NURSING NOTE
Due to patient being non-English speaking/uses sign language, an  was used for this visit. Only for face-to-face interpretation by an external agency, date and length of interpretation can be found on the scanned worksheet.     name: Mag Gaspar  Agency: [employer]  Language: Zora   Telephone number: [phone]  Type of interpretation: Face-to-face, spoken

## 2019-07-16 NOTE — RESULT ENCOUNTER NOTE
Please call results and recommendations  Your kidney function is normal.  This test will be repeated when you return in about 3 weeks for a blood pressure check on your new medication LOSARTAN 50mg daiy.

## 2019-07-18 NOTE — RESULT ENCOUNTER NOTE
Spoke to pt, gave result. Pt next appointment in on 8/5/2019 at 10:40 and pt has started taking her blood pressure medication.

## 2019-07-19 NOTE — PROGRESS NOTES
HPI  69-year-old [facility] female who initiated blood pressure treatment with hydrochlorothiazide about 10 days ago.  She started hydrochlorothiazide 25 mg each day.  She took the medication for about a week and felt very weak and tired.  She stopped the medication last Thursday and her fatigue completely resolved.  She feels back to normal.  She had a normal basic metabolic profile in May 2019.  She has checked her blood pressure outside the clinic while on hydrochlorothiazide and noted that her systolic blood pressure stayed about 170 as it was at both her May and July 3, 2019 office visits.  She would like to change blood pressure medication due to the associated fatigue and lack of blood pressure change.    Review of systems  No recent illness, no fevers  No weight change  No chest pain episodes  No shortness of breath  No leg swelling    Exam  Vitals are reviewed blood pressure is 170/81  General: Alert in no distress.  She relates her detailed history through an outside [facility]  without difficulty  HEENT: Sclera nonicteric and noninjected.  Moist mucous membranes neck is free of adenopathy  Cardiovascular: Regular rate and rhythm without murmur  Respiratory: Clear bilaterally  Extremities: Free of edema    Assessment and plan  1) hypertension: Discontinue hydrochlorothiazide  Initiate losartan at 50 mg daily  Check basic metabolic profile  Continue to monitor with home blood pressure cuff 2-3 times per week  Follow-up in 2 to 3 weeks for repeat blood pressure and basic metabolic profile  Please call if you experience side effects such as repeat fatigue, cough, dizziness, light headedness, or orthostatic symptoms.    Results for orders placed or performed in visit on 07/15/19   Basic Metabolic Panel ([facility]) - Results < 1 hr   Result Value Ref Range    Glucose 93.0 60.0 - 109.0 mg/dL    Urea Nitrogen 12.0 7.0 - 30.0 mg/dL    Creatinine 0.8 0.6 - 1.3 mg/dL    Sodium 139.0 133.0 - 144.0 mmol/L    Potassium  4.1 3.4 - 5.3 mmol/L    Chloride 109.0 94.0 - 109.0 mmol/L    Carbon Dioxide 29.0 20.0 - 32.0 mmol/L    Calcium 9.4 8.5 - 10.4 mg/dL    eGFR Calculated (Non Black Reference) 75.8 >60.0 mL/min    eGFR Calculated (Black Reference) >90 >60.0 mL/min     Igor Mary MD

## 2019-08-05 ENCOUNTER — OFFICE VISIT (OUTPATIENT)
Dept: FAMILY MEDICINE | Facility: CLINIC | Age: 69
End: 2019-08-05
Payer: MEDICARE

## 2019-08-05 VITALS
HEIGHT: 57 IN | TEMPERATURE: 98.1 F | HEART RATE: 54 BPM | SYSTOLIC BLOOD PRESSURE: 183 MMHG | WEIGHT: 136 LBS | DIASTOLIC BLOOD PRESSURE: 101 MMHG | OXYGEN SATURATION: 99 % | BODY MASS INDEX: 29.34 KG/M2 | RESPIRATION RATE: 16 BRPM

## 2019-08-05 DIAGNOSIS — I10 ESSENTIAL HYPERTENSION: Primary | ICD-10-CM

## 2019-08-05 LAB
BUN SERPL-MCNC: 11 MG/DL (ref 7–30)
CALCIUM SERPL-MCNC: 9.4 MG/DL (ref 8.5–10.4)
CHLORIDE SERPLBLD-SCNC: 107 MMOL/L (ref 94–109)
CO2 SERPL-SCNC: 29 MMOL/L (ref 20–32)
CREAT SERPL-MCNC: 0.8 MG/DL (ref 0.6–1.3)
EGFR CALCULATED (BLACK REFERENCE): >90 ML/MIN
EGFR CALCULATED (NON BLACK REFERENCE): 75.8 ML/MIN
GLUCOSE SERPL-MCNC: 102 MG/DL (ref 60–109)
POTASSIUM SERPL-SCNC: 4.1 MMOL/L (ref 3.4–5.3)
SODIUM SERPL-SCNC: 143 MMOL/L (ref 133–144)

## 2019-08-05 RX ORDER — LOSARTAN POTASSIUM AND HYDROCHLOROTHIAZIDE 12.5; 5 MG/1; MG/1
1 TABLET ORAL DAILY
Qty: 30 TABLET | Refills: 0 | Status: SHIPPED | OUTPATIENT
Start: 2019-08-05 | End: 2019-09-30

## 2019-08-05 ASSESSMENT — MIFFLIN-ST. JEOR: SCORE: 1025.26

## 2019-08-05 NOTE — LETTER
August 8, 2019      Sona Sanders  [address] [address] [place] [id number]        Dear Sona,    Your kidney function is normal.   Return to see Dr. Mary in about 3 weeks after taking your new blood pressure medication Hyzaar every day for 2 to 3 weeks.     Please see below for your test results.    Resulted Orders   Basic Metabolic Panel ([facility])  - Results < 1 hr   Result Value Ref Range    Glucose 102.0 60.0 - 109.0 mg/dL    Urea Nitrogen 11.0 7.0 - 30.0 mg/dL    Creatinine 0.8 0.6 - 1.3 mg/dL    Sodium 143.0 133.0 - 144.0 mmol/L    Potassium 4.1 3.4 - 5.3 mmol/L    Chloride 107.0 94.0 - 109.0 mmol/L    Carbon Dioxide 29.0 20.0 - 32.0 mmol/L    Calcium 9.4 8.5 - 10.4 mg/dL    eGFR Calculated (Non Black Reference) 75.8 >60.0 mL/min    eGFR Calculated (Black Reference) >90 >60.0 mL/min       If you have any questions, please call the clinic to make an appointment.    Sincerely,    Igor Mary MD

## 2019-08-05 NOTE — PATIENT INSTRUCTIONS
STOP your current LOSARTAN    Instead take the medication Hyzaar (Losartan + hydrochlorothiazide) 50 + 12.5 mg daily    Return to clinic in 2 weeks

## 2019-08-05 NOTE — NURSING NOTE
Due to patient being non-English speaking/uses sign language, an  was used for this visit. Only for face-to-face interpretation by an external agency, date and length of interpretation can be found on the scanned worksheet.     name: Eric Gaspar  Agency: [employer]  Language: Zora   Telephone number: [phone]  Type of interpretation: Face-to-face, spoken

## 2019-08-05 NOTE — PROGRESS NOTES
Assessment and Plan       Sona Sanders is a 69 year old female with past medical hx including hypertension who presented to clinic today for uncontrolled hypertension.     1. Essential hypertension  Discontinue current losartan pill and start combination pill   Hyzaar (losartan 50mg + hydrochlorothiazide 12.5mg).  Discussed how these are both medication she has used in the past 2 months, and how the combination can work effectively to reduce her blood pressure to goal.    - Basic Metabolic Panel ([facility])  - Results < 1 hr  - losartan-hydrochlorothiazide (HYZAAR) 50-12.5 MG tablet; Take 1 tablet by mouth daily  Dispense: 30 tablet; Refill: 0    Follow-up in 2 to 3 weeks for recheck of blood pressure and basic metabolic profile.  Return earlier if you experience any side effects or have new concerns.      Igor Mary MD    Options for treatment and follow-up care were reviewed with the patient, Sona Sanders who engaged in the decision making process and verbalized understanding of the options discussed and agreed with the final plan.    In supervising the medical student, I saw and evaluated the patient with the student and personally performed all aspects of the history and physical.  I have reviewed and verified that the documentation is correct and complete.             HPI:       Chief Complaint   Patient presents with     Hypertension     follow-up meds, has inceased appetite     Medication Reconciliation     needs attention       Sona Sanders is a 69 year old  female with a significant past medical history of hypertension who presents for follow up of concern(s) listed below of uncontrolled hypertension.  She has been using losartan 50 mg daily over the past 2-1/2 weeks.  No noted side effects.  No dizziness, lightheadedness, orthostatic symptoms.  No headaches or visual changes.  She became concerned when she saw a friend's blood pressure medication dose was only 10 mg (lisinopril) and wonders if she should be  taking lisinopril 10 mg instead of the losartan.  I reviewed her chart with her and reminded her that she experience a rash when she tried lisinopril earlier this year.  She is concerned about the high dosages of medication, but is reassured when told the doses she has tried are relatively small, and the starting doses of these medications. She tried hydrochlorothiazide 25 mg a day back in July and did not notice a change in her blood pressure and felt fatigued.  She stopped the medication and her fatigue resolved.    We had a discussion today via the  explaining the different medications have different effective dose ranges.  We also discussed how a combination of blood pressure medications often can be more effective than a single medication, and may allow the medications to work at a lower effective dose.     A [place]  was used for this visit         Review of Systems:   Recent increase in appetite  No polyuria or polydipsia  : NEGATIVE for fatigue, unexpected change in weight  E: NEGATIVE for acute vision problems or changes  R: NEGATIVE for significant cough or shortness of breath  CV: NEGATIVE for chest pain, palpitations or new or worsening peripheral edema  P: NEGATIVE for changes in mood or affect            PFSH:   Problem List   Patient Active Problem List   Diagnosis     Caregiver burden     Cervical cancer screening     Advance care planning     Elevated blood pressure reading without diagnosis of hypertension     Bradycardia        Medications   Current Outpatient Medications   Medication Sig Dispense Refill     losartan (COZAAR) 50 MG tablet Take 1 tablet (50 mg) by mouth daily 30 tablet 0     omeprazole (PRILOSEC) 20 MG DR capsule Take 1 capsule (20 mg) by mouth daily 90 capsule 3     Ranitidine HCl (ZANTAC PO) Take 1 tablet by mouth daily as needed       metroNIDAZOLE (METROCREAM) 0.75 % external cream Apply 1 g topically daily  11        Social History    Social History  "    Socioeconomic History     Marital status:      Spouse name: Not on file     Number of children: Not on file     Years of education: Not on file     Highest education level: Not on file   Occupational History     Not on file   Social Needs     Financial resource strain: Not on file     Food insecurity:     Worry: Not on file     Inability: Not on file     Transportation needs:     Medical: Not on file     Non-medical: Not on file   Tobacco Use     Smoking status: Never Smoker     Smokeless tobacco: Never Used   Substance and Sexual Activity     Alcohol use: No     Alcohol/week: 0.0 oz     Drug use: No     Sexual activity: Not Currently   Lifestyle     Physical activity:     Days per week: Not on file     Minutes per session: Not on file     Stress: Not on file   Relationships     Social connections:     Talks on phone: Not on file     Gets together: Not on file     Attends Yarsani service: Not on file     Active member of club or organization: Not on file     Attends meetings of clubs or organizations: Not on file     Relationship status: Not on file     Intimate partner violence:     Fear of current or ex partner: Not on file     Emotionally abused: Not on file     Physically abused: Not on file     Forced sexual activity: Not on file   Other Topics Concern     Parent/sibling w/ CABG, MI or angioplasty before 65F 55M? 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    GENERAL APPEARANCE: healthy, alert and no distress  Relates her " history through a outside professional [facility]   HEENT: Head is atraumatic.  Eyes sclera nonicteric noninjected.  Limited funduscopic exam is unremarkable.  Moist mucous membranes without tonsillar hypertrophy or exudate.  Neck is free of adenopathy  No jugular venous distention  Cardiovascular: Regular rate and rhythm without murmur  Respiratory: Clear bilaterally  Extremities: No significant peripheral edema    Results for orders placed or performed in visit on 08/05/19   Basic Metabolic Panel ([facility])  - Results < 1 hr   Result Value Ref Range    Glucose 102.0 60.0 - 109.0 mg/dL    Urea Nitrogen 11.0 7.0 - 30.0 mg/dL    Creatinine 0.8 0.6 - 1.3 mg/dL    Sodium 143.0 133.0 - 144.0 mmol/L    Potassium 4.1 3.4 - 5.3 mmol/L    Chloride 107.0 94.0 - 109.0 mmol/L    Carbon Dioxide 29.0 20.0 - 32.0 mmol/L    Calcium 9.4 8.5 - 10.4 mg/dL    eGFR Calculated (Non Black Reference) 75.8 >60.0 mL/min    eGFR Calculated (Black Reference) >90 >60.0 mL/min          This note was completed with the assistance of dictation software. Typos and word substitution-errors are expected and unintended.

## 2019-08-07 NOTE — RESULT ENCOUNTER NOTE
Please send result letter    Your kidney function is normal.  Return to see Dr. Mary in about 3 weeks after taking your new blood pressure medication Hyzaar every day for 2 to 3 weeks.

## 2019-08-19 ENCOUNTER — OFFICE VISIT (OUTPATIENT)
Dept: FAMILY MEDICINE | Facility: CLINIC | Age: 69
End: 2019-08-19
Payer: MEDICARE

## 2019-08-19 VITALS
BODY MASS INDEX: 28 KG/M2 | DIASTOLIC BLOOD PRESSURE: 85 MMHG | OXYGEN SATURATION: 100 % | HEIGHT: 58 IN | SYSTOLIC BLOOD PRESSURE: 147 MMHG | HEART RATE: 60 BPM | RESPIRATION RATE: 16 BRPM | WEIGHT: 133.4 LBS | TEMPERATURE: 97.5 F

## 2019-08-19 DIAGNOSIS — I10 ESSENTIAL HYPERTENSION: Primary | ICD-10-CM

## 2019-08-19 PROBLEM — R03.0 ELEVATED BLOOD PRESSURE READING WITHOUT DIAGNOSIS OF HYPERTENSION: Status: RESOLVED | Noted: 2017-01-16 | Resolved: 2019-08-19

## 2019-08-19 LAB
BUN SERPL-MCNC: 10 MG/DL (ref 7–30)
CALCIUM SERPL-MCNC: 9.4 MG/DL (ref 8.5–10.4)
CHLORIDE SERPLBLD-SCNC: 97 MMOL/L (ref 94–109)
CO2 SERPL-SCNC: 29 MMOL/L (ref 20–32)
CREAT SERPL-MCNC: 1 MG/DL (ref 0.6–1.3)
EGFR CALCULATED (BLACK REFERENCE): 70.9 ML/MIN
EGFR CALCULATED (NON BLACK REFERENCE): 58.6 ML/MIN
GLUCOSE SERPL-MCNC: 108 MG/DL (ref 60–109)
POTASSIUM SERPL-SCNC: 3.5 MMOL/L (ref 3.4–5.3)
SODIUM SERPL-SCNC: 137 MMOL/L (ref 133–144)

## 2019-08-19 ASSESSMENT — MIFFLIN-ST. JEOR: SCORE: 1019.72

## 2019-08-19 NOTE — NURSING NOTE
Wellness exam--offer appt  Dexa scan- MD to decide   HepC screen?  Lipid?  Offer PPSV23 ok with  Tdap ok with- needs to get at pharmacy per insurance    Due to patient being non-English speaking/uses sign language, an  was used for this visit. Only for face-to-face interpretation by an external agency, date and length of interpretation can be found on the scanned worksheet.     name: allie arden  Agency: [employer]  Language: Hmong   Telephone number: [phone]  Type of interpretation: Face-to-face, spoken

## 2019-08-19 NOTE — RESULT ENCOUNTER NOTE
Please call results and recommendations  Your sodium and potassium levels are normal.   The slight change in your kidney function test is expected with your blood pressure medication.  It is important to continue your blood pressure medication to protect your kidneys and reduce your risk for blood vessel problems, stroke and heart attack.

## 2019-08-25 NOTE — PROGRESS NOTES
History  69-year-old female presents for management of hypertension.  She started Hyzaar 50/12.5 mg 3 weeks ago.  Denies any dizziness lightheadedness or orthostatic symptoms.  She has been checking her blood pressure readings with systolics in the 130s 140s.  She is happy with her current medication regimen.    Review of systems  No chest pain episodes  No racing heart or palpitations  No shortness of breath  She has occasional reflux symptoms of epigastric discomfort after eating a large meal which resolves with omeprazole or Zantac    Exam  Vitals are reviewed with blood pressure 147/85  General: Alert no distress.  She relates her history through an outside professional [employer]   HEENT: Atraumatic.  Moist mucous membranes.  Neck is free of adenopathy  Cardiovascular: Regular rate and rhythm without murmur  Respiratory: Clear bilaterally  Abdomen soft nontender  Extremities Free of edema    Assessment plan  1) Hypertension: Blood pressure has improved 30-40  points compared to 2 weeks ago.  Continue current Hyzaar dose 50/12.5 mg daily.  Basic metabolic profile today shows slight increase in creatinine from 0.8 on August 5, 2019 now 1.0.  Normal electrolytes.    Follow-up in clinic in 3 months, earlier if home blood pressures consistently systolic greater than 140 or less than 100 or any symptoms.

## 2019-09-27 DIAGNOSIS — I10 ESSENTIAL HYPERTENSION: ICD-10-CM

## 2019-09-30 RX ORDER — LOSARTAN POTASSIUM AND HYDROCHLOROTHIAZIDE 12.5; 5 MG/1; MG/1
1 TABLET ORAL DAILY
Qty: 90 TABLET | Refills: 1 | Status: SHIPPED | OUTPATIENT
Start: 2019-09-30 | End: 2019-11-04

## 2019-11-04 ENCOUNTER — TELEPHONE (OUTPATIENT)
Dept: FAMILY MEDICINE | Facility: CLINIC | Age: 69
End: 2019-11-04

## 2019-11-04 DIAGNOSIS — I10 HYPERTENSION, UNSPECIFIED TYPE: Primary | ICD-10-CM

## 2019-11-04 RX ORDER — HYDROCHLOROTHIAZIDE 12.5 MG/1
25 TABLET ORAL DAILY
Qty: 90 TABLET | Refills: 1 | Status: SHIPPED | OUTPATIENT
Start: 2019-11-04 | End: 2020-06-30

## 2019-11-04 RX ORDER — LOSARTAN POTASSIUM 50 MG/1
50 TABLET ORAL DAILY
Qty: 90 TABLET | Refills: 1 | Status: SHIPPED | OUTPATIENT
Start: 2019-11-04 | End: 2020-06-30

## 2019-11-04 NOTE — TELEPHONE ENCOUNTER
Prior Authorization needed on:  11/4/19    Medication:  Lasartan/HCTZ  Dose:  50/12.5 mg    Pharmacy confirmed as   [id number] - [place] - [address] [facility]  [id number] [place] [address]  [place] [address]  Phone: [phone] Fax: [phone]  : Yes    Insurance Name:    Insurance Phone:   Insurance Patient ID:     Alternatives Suggested: Please separate medication - Lasartan and hydrochlorothiazide     Zuleima Zaldivar MA November 4, 2019 at 2:57 PM

## 2019-12-17 ENCOUNTER — TRANSFERRED RECORDS (OUTPATIENT)
Dept: HEALTH INFORMATION MANAGEMENT | Facility: CLINIC | Age: 69
End: 2019-12-17

## 2020-01-03 ENCOUNTER — TRANSFERRED RECORDS (OUTPATIENT)
Dept: HEALTH INFORMATION MANAGEMENT | Facility: CLINIC | Age: 70
End: 2020-01-03

## 2020-01-31 ENCOUNTER — TELEPHONE (OUTPATIENT)
Dept: FAMILY MEDICINE | Facility: CLINIC | Age: 70
End: 2020-01-31

## 2020-01-31 NOTE — TELEPHONE ENCOUNTER
.Sona Sanders called to schedule an appointment for TB screening.        TB Screening questions  1. Have you had recent contact with a person with active tuberculosis (TB)?  No, continue to next question.  2. Have you ever been treated for tuberculosis (TB) or latent TB before?  No, continue to next question.  3. Has a county worker or another healthcare worker (not your employer) told you to come in to be tested for TB?  No, continue to next question.  4. Have you had a live vaccine (smallpox, flumist, MMR, varicella, oral polio and/or yellow fever) in the last 4 weeks?  No, lab visit scheduled.       Provider visit scheduled for 02/10/2020 for wellness exam     Prasad Coleman

## 2020-02-05 ENCOUNTER — MEDICAL CORRESPONDENCE (OUTPATIENT)
Dept: HEALTH INFORMATION MANAGEMENT | Facility: CLINIC | Age: 70
End: 2020-02-05

## 2020-02-10 ENCOUNTER — OFFICE VISIT (OUTPATIENT)
Dept: FAMILY MEDICINE | Facility: CLINIC | Age: 70
End: 2020-02-10
Payer: MEDICARE

## 2020-02-10 VITALS
TEMPERATURE: 97.7 F | DIASTOLIC BLOOD PRESSURE: 89 MMHG | OXYGEN SATURATION: 97 % | SYSTOLIC BLOOD PRESSURE: 156 MMHG | WEIGHT: 136.2 LBS | RESPIRATION RATE: 22 BRPM | BODY MASS INDEX: 28.59 KG/M2 | HEART RATE: 85 BPM | HEIGHT: 58 IN

## 2020-02-10 DIAGNOSIS — Z00.00 WELLNESS EXAMINATION: Primary | ICD-10-CM

## 2020-02-10 DIAGNOSIS — Z11.1 SCREENING EXAMINATION FOR PULMONARY TUBERCULOSIS: ICD-10-CM

## 2020-02-10 LAB
CHOLEST SERPL-MCNC: 275 MG/DL
FASTING?: NO
HDLC SERPL-MCNC: 65 MG/DL
LDLC SERPL CALC-MCNC: 171 MG/DL
TRIGL SERPL-MCNC: 193 MG/DL

## 2020-02-10 ASSESSMENT — MIFFLIN-ST. JEOR: SCORE: 1044.93

## 2020-02-10 NOTE — NURSING NOTE
Due to patient being non-English speaking/uses sign language, an  was used for this visit. Only for face-to-face interpretation by an external agency, date and length of interpretation can be found on the scanned worksheet.     name: Himanshu ( ID# [id number] Charles)  Agency: [employer] only)  Language: Hmong   Telephone number: NA  Type of interpretation: Telemedicine, spoken     No vision and hearing screening per MD

## 2020-02-10 NOTE — LETTER
2/10/2020    Re: Sona Sanders  1950      To Whom It May Concern:     Sona Sanders needs Tdap and vaccination for zoster to be completed at pharmacy.    Thank you      Renetta Crowell MD  2/10/2020 12:02 PM

## 2020-02-10 NOTE — NURSING NOTE
Medicare Wellness Visit  Health Risk Assessment           Health Risk Assessment / Review of Systems     Constitutional: Any fevers or night sweats? No     Eyes:  Vision problems    YES - reports vision at night more difficult, occasionally affects driving at night.    Hearing Do you feel you have hearing loss?  No     Cardiovascular: Any chest pain, fast or irregular heart beat, calf pain with walking?     No           Respiratory:   Any breathing problems or cough?   No     Gastrointestinal: Any stomach or stool problems?   No      Genitourinary: Do you have difficulty controlling urination?   No     Muscles and Joints: Any joint stiffness or soreness?    YES - hx 2010 arthroscopy surgery left knee, c/o intermittent has left knee pain. C/o right shoulder discomfort x 2 years.    Skin: Any concerning lesions or moles?   No- 1/2020 rash on face, itching. Has seen Derm with improvement.    Nervous System: Any loss of strength or feeling, numbness or tingling, shaking, dizziness, or headache?   YES -occasional toes cramp up during night time.    Mental Health: Any depression, anxiety or problems sleeping?   Yes- difficulty falling asleep.    Cognition: Do you have any problems with your memory?    YES - noticed decrease in short term memory. Given example: after shopping go out to parking lot and does not recall where she had parked.           Medical Care     What other specialists or organizations are involved in your medical care?  NONE  Patient Care Team       Relationship Specialty Notifications Start End    Renetta Crowell MD PCP - General Family Practice  6/9/15     Phone: [phone] Fax: [phone]         [facility] [address] [place] [id number]          Have you been to the ER or overnight in the hospital in the last year?  No          Social History / Home Safety       Marital Status:  Who lives in your household?  , son and daughter in law, 3 grandchildren    Do  you feel threatened or controlled by a partner, ex-partner or anyone in your life? No     Has anyone hurt you physically, for example by pushing, hitting, slapping or kicking you   or forcing you to have sex? No          Does your home have any of the following safety concerns; loose rugs in the hallway,  bathrooms with no grab bars by the tub or toilet, stairs with no handrails or poorly lit areas?  No     Do you need help with dressing yourself, bathing, eating or getting around your home?  No     Do you need help with the phone, transportation, shopping, preparing meals, housework, laundry, medications or managing money?No       Risk Behaviors and Healthy Habits     History   Smoking Status     Never Smoker   Smokeless Tobacco     Never Used     How many servings of fruits and vegetables do you eat a day? 2-3 servings a day, encouraged to increase intake to 4-5 servings a day.    Exercise: 5 x a week goes to [employer] day [employer], there will do video exercises or walk on treadmill x 20-30 mins.     Do you frequently drive without a seatbelt? No     Do you use tobacco?  No     Do you use any other drugs? No         Do you use alcohol?No      Frailty Assessment            Have you lost 10 or more pounds unintentionally in the previous year? No     How difficult is walking from one room to the other on the same level?not       Is it difficult to lift or carry something as heavy as 10 pounds?not      Compared with most (men/women) your age, would you say  that you are more active, less active, or about the same?  same        FALL RISK ASSESSMENT 2/10/2020 5/24/2019   Fallen 2 or more times in the past year? No No   Any fall with injury in the past year? No No   Timed Up and Go Test/Seconds (13.5 is a fall risk; contact physician) 9 -         Advance Directives:   Discussed with patient and family as appropriate. Has patient  completed advance directives and/or a living will? Yes- would like to addend advance directive. MD  to further discuss with Hope during visit.      Meenakshi Gomez RN

## 2020-02-10 NOTE — PROGRESS NOTES
Medicare Wellness Visit         HPI         This 69 year old female presents as an established patient myself who presents for an subsequent Medicare Wellness Exam.  Patient also reports that she would like a modification to her advanced directive in that if there is low likelihood of recovery she would not want to be ressutated.       Patient speaks English and so an  was not used.     Patient Active Problem List   Diagnosis     Caregiver burden     Cervical cancer screening     Advance care planning     Bradycardia     Essential hypertension       Past Medical History:   Diagnosis Date      (normal spontaneous vaginal delivery)     x 6        Family History   Problem Relation Age of Onset     Hypertension Mother      Cerebrovascular Disease Mother      Hypertension Father      Cerebrovascular Disease Father      Reviewed no other significant FH    Family History and past Medical History reviewed and unchanged/updated.         Review of Systems:       Constitutional:   fevers, night sweats or unintentional weight change ?  NO      Eyes:   vision change, diplopia or red eyes?  NO      Ears, Nose, Mouth, Throat:   tinnitus or hearing change,  epistaxis or nasal discharge,  oral lesions, throat pain ?  NO      Neck:   stiffness?  NO           Cardiovascular:   chest pain, palpitations, or pain with walking, orthopnea or PND?  NO   Breasts:  Any bumps or unusual discharge?     NO         Respiratory:   dyspnea, cough, shortness of breath or wheezing?  NO         GI:   nausea, vomiting, diarrhea or constipation,  abdominal pain ?  NO         :   change in urine,  dysuria or hematuria,  sexual dysfunction ?  NO        Musculoskeletal:   joint or muscle pain or swelling?  NO            Skin:   concerning lesions or moles?  NO           Nervous System:   loss of strength or sensation,  numbness or tingling,  tremor,  dizziness,  headache?  NO   Endocrine/Homone:   polyuria or polydipsia,  temperature  intolerance?  NO            Blood and Lymphnodes:   concerning bumps,  bleeding problems?  NO            Allergy:   environmental allergies?  NO            Mental Health:   depression or anxiety,  sleep problems?  NO               Medical Care     Have you been to an ER or a hospital in the last year?  NO  What other specialists or organizations are involved in your medical care?  none         Social History     Social History     Tobacco Use     Smoking status: Never Smoker     Smokeless tobacco: Never Used   Substance Use Topics     Alcohol use: No     Alcohol/week: 0.0 standard drinks     Marital Status:  Who lives in your household? Lives with , 3 grandchildren, two children    Do you feel threatened or controlled by a partner, ex-partner or anyone in your life? No  Has anyone hurt you physically, for example by pushing, hitting, slapping or kicking you   or forcing you to have sex? No      RISK BEHAVIORS AND HEALTHY HABITS:     How many meals or snacks do you eat daily? Minimal.  Eats 2 meals a day. 10am and 4pm. Eats primarily rice and bland vegetable.  Takes in some protei: meats in small amounts  Weight has been stable  How often do you exercise and what do you do? Exercises daily  Do you frequently ride without a seatbelt? No  Do you use tobacco?  No  Do you use any other drugs? No         Do you use alcohol?No    FUNCTIONAL ABILITY/SAFETY SCREENING     Does your home have any of the following safety concerns? Loose rugs in the hallway, no grab bars in the bathroom, no handrails on the stairs or have poorly lit areas?  No  GET UP AND GO TEST: 12 seconds.   Unsteady gait? No Was the patient's timed Up & Go test unsteady or longer than 30 seconds? No    Do you need help with the phone, transportation, shopping, preparing meals, housework, laundry, medications or managing money? No   Details:none  Have you noticed any hearing difficulties? No  Hearing evaluation if done: No  Corrected Visual  "acuity: notes some trouble with visiion over time.  Would like to see eye doctor    EVALUATION OF COGNITIVE FUNCTION     Mood/affect:Normal  Appearance:Normal  Family member/caregiver input: none    MINI COG   Scoring  one point for each word  The test is administered as follows:  1. Instruct the patient to listen carefully to and remember 3 unrelated words and then to repeat the words. EG. \"Ball\"  \"Pen\"   \"Dog\"  2. Instruct the patient to draw the face of a clock, either on a blank sheet of paper, or on a sheet with the clockcircle already drawn on the page. After the patient puts the numbers on the clock face, ask him or her to draw the hands of the clock to read a specific time, such as 11:20. These instructions can be repeated, but no additional instructions should be given. Give the patient as much time as needed to complete the task. The CDT serves as the recall distractor.     3. Ask the patient to repeat the 3 previously presented word               0  points is a positive screen for cognitive impairment  1-2 points and an Abnormal CDT is a positive screen for cognitive impairment  1-2 points and a Normal CDT is a negative screen  for cognitive impairment  3    points is a Negative screen for dementia (no need to score CDT)    Interpretation: Cognitive impairment?normal for age    SCREENING  PREVENTION and EARLY DETECTIOM     ECG (if done)not performed    Advanced Directives: Discussed and patient desires to be DNR/DNI.  POLST a simple advance directive form  is available at [email or web address]   or by searching \"[place]\"      Immunization History   Administered Date(s) Administered     HepA-Adult 06/22/2018     Influenza (High Dose) 3 valent vaccine 10/31/2016     Influenza (IIV3) PF 01/10/2005, 09/22/2010, 09/29/2011, 09/28/2013     Influenza Vaccine IM > 6 months Valent IIV4 10/13/2014, 10/23/2015     Pneumo Conj 13-V (2010&after) 04/18/2016     Pneumococcal 23 valent " "08/19/2019     TD (ADULT, 7+) 05/21/2003, 12/05/2006     Reviewed Immunization Record Today  Pneumoccocal Vaccine: No  Varicella Vaccine: recommend  TDaP:Yes    Blood pressures at home are lower.  Unable to report numbers verbatum tdoay.            Physical Exam:     Vitals: BP (!) 156/89   Pulse 85   Temp 97.7  F (36.5  C)   Resp 22   Ht 1.485 m (4' 10.47\")   Wt 61.8 kg (136 lb 3.2 oz)   SpO2 97%   BMI 28.01 kg/m     BMI= Body mass index is 28.01 kg/m .  GENERAL APPEARANCE: healthy, alert and no distress  EYES: Eyes grossly normal to inspection, PERRL and conjunctivae and sclerae normal  HENT: ear canals and TM's normal, nose and mouth without ulcers or lesions, oropharynx clear and oral mucous membranes moist  NECK: no adenopathy, no asymmetry, masses, or scars and thyroid normal to palpation  RESP: lungs clear to auscultation - no rales, rhonchi or wheezes  BREAST: normal without masses, tenderness or nipple discharge and no palpable axillary masses or adenopathy  CV: regular rate and rhythm, normal S1 S2, no S3 or S4, no murmur, click or rub, no peripheral edema and peripheral pulses strong  ABDOMEN: soft, nontender, no hepatosplenomegaly, no masses and bowel sounds normal  MS: no musculoskeletal defects are noted and gait is age appropriate without ataxia  SKIN: no suspicious lesions or rashes  NEURO: Normal strength and tone, sensory exam grossly normal, mentation intact and speech normal  PSYCH: mentation appears normal and affect normal/bright    Assessment and Plan     1. Wellness examination-needs TB quant given adult  status.  Preventive screening below  - Lipid [place] ([email or web address]) - Results > 1 hr  - TB Quantiferon Gold Plus ([employer])  - OPTOMETRY REFERRAL  - Dexa hip/pelvis/spine*; Future  - Hepatitis C Antibody ([email or web address])  - GASTROENTEROLOGY ADULT REF PROCEDURE ONLY  - triamcinolone (KENALOG) 0.1 % external ointment; Apply topically 3 times daily When rash present  Dispense: 80 g; " Refill: 0    2. Screening examination for pulmonary tuberculosis-positive TB quant.  Exposure likely through adult  or other.   - INFECTIOUS DISEASE REFERRAL; Future      Options for treatment and follow-up care were reviewed with the Sona Munsono and/or guardian engaged in the decision making process and verbalized understanding of the options discussed and agreed with the final plan.    Renetta Crowell MD

## 2020-02-10 NOTE — PATIENT INSTRUCTIONS
PERSONAL PREVENTIVE SERVICES PLAN - SERVICES     Review these tests with your medical staff then decide which ones you want and take this page home for your reference      SCREENING TESTS     Description   Year of Last Screening   Recommended Today?   Heart disease screening blood tests    Cholesterol level Reducing cholesterol can reduce your risk of heart attacks by 25%.  Screening is recommended yearly if you are at risk of heart disease otherwise every 4-5 years 6/27/2016 Yes; Recommended    Diabetes screening tests    Hemoglobin A1c blood test   Finding and treating diabetes early can reduce complications.  Screening recommended/covered yearly if you have high blood pressure, high cholesterol, obesity (BMI >30), or a history of high blood glucose tests; or 2 of the following: family history of diabetes, overweight (BMI >25 but <30), age 65 years or older, and a history of diabetes of pregnancy or gave birth to baby weighing more than 9 lbs. Bmp  Normal   Glucose  108 Yes; Recommended    Hepatitis B screening Finding hepatitis B early can reduce complications.  Screening is recommended for persons with selected risk factors.  No: is not indicated today.   Hepatitis C screening Finding hepatitis C early can reduce complications.  Screening is recommended for all persons born from 1945 through 1965 and for those with selected other risk factors.   Yes; Recommended   HIV screening Finding HIV early can reduce complications.  Screening is recommended for persons with risk factors for HIV infection.  No: is not indicated today.   Glaucoma screening Early detection of glaucoma can prevent blindness.   Please talk to your eye doctor about this.       SCREENING TESTS     Description   Year of Last Screening   Recommended Today?   Colorectal cancer screening    Fecal occult blood test     Screening colonoscopy Screening for colon cancer has been shown to reduce death from colon cancer by 25-30%. Screening recommended to  start at 50 years and continuing until age 75 years.    Yes; Recommended    Breast Cancer Screening (women)    Mammogram Mammogram screening for breast cancer has been shown to reduce the risk of dying from breast cancer and prolong life. Screening is recommended every 1-2 years for women aged 50 to 74 years.  2/28/2012 Yes; Recommended    Cervical Cancer screening (women)    Pap Cervical pap smears can reduce cervical cancer. Screening is recommended annually if high risk (history of abnormal pap smears) otherwise every 2-3 years, stop screening at 65 years of age if history of normal paps. 6/27/2016  normal No: is not indicated today.   Screening for Osteoporosis:  Bone mass measurements (women)    Dexa Scan Screening and treating Osteoporosis can reduce the risk of hip and spine fractures. Screening is recommended in women 65 years or older and in women and men at risk of osteoporosis.  Yes; Recommended    Screening for Lung Cancer     Low-dose CT scanning Screening can reduce mortality in persons aged 55-80 who have smoked at least 30 pack-years and who are either still smoking or have quit in the past 15 years.  No: is not indicated today.   Abdominal Aortic Aneurysm (AAA) screening    Ultrasound (US)   An aneurysm treated before rupture is very safe -a ruptured aneurysm can be fatal.  Screening  by US for AAA is limited to patients who meet one of the following criteria:    Men who are 65-75 years old and have smoked more than 100 cigarettes in their lifetime    Anyone with a family history of abdominal aortic aneurysm  No: is not indicated today.     Here are your recommended immunizations.  Take this home for your reference.                                                    IMMUNIZATIONS Description Recommend today?     Influenza (Flu shot) Prevents flu; should get every year Yes; Recommended    PCV 13 Pneumonia vaccination; you get it once No; is up to date.   PPSV 23 Second pneumonia vaccination; usually  get it 1 year after PCV 13 No; is up to date.   Zoster (Shingles) Prevents shingles; you get it once  (Check with Part D insurance for coverage, must receive at a pharmacy, not clinic) Yes; Recommended    Tetanus Prevents tetanus; once every 10 years Yes; Recommended      Hepatitis B  If you have any of the following risk factors you should be immunized for hepatitis B: severe kidney disease, people who live in the same house as a carrier of Hepatitis B virus, people who live in  institutions (e.g. nursing homes or group homes), homosexual men, patients with hemophilia who received Factor VIII or IX concentrates, abusers of illicit injectable drugs No: is not indicated today.      PATIENT INSTRUCTIONS    Yearly exam:     See your health care provider every year in order to review changes in your health, review medicines that you take, and discuss preventive care needs such as immunizations and cancer screening.    Get a flu shot each year.     Advance Directives:    If you have not done so, you are encouraged to complete advance directives and/or a living will.   More information about advance directives can be found at: http://www.[place]med.org/advocacy/Key-Issues/Advance-Directives    Nutrition:     Eat at least 5 servings of fruits and vegetables each day.     Eat whole-grain bread, whole-wheat pasta and brown rice instead of white grains and rice.     Talk to your doctor about Calcium and Vitamin D.     Lifestyle:    Exercise for at least 150 minutes a week (30 minutes a day, 5 days a week). This will help you control your weight and prevent disease.     Limit alcohol to one drink per day.     If you smoke, try to quit - your doctor will be happy to help.     Wear sunscreen to prevent skin cancer.     See your dentist every six months for an exam and cleaning.     See your eye doctor every 1 to 2 years to screen for conditions such as glaucoma, macular degeneration and cataracts.      Referral for :     Colonoscopy     LOCATION/PLACE/Provider :    [facility] with Dr. Tariq   DATE & TIME :    March 19th at 12:30  PHONE :     [phone]  FAX :    [phone]  Appointment made by clinic staff/:    Karla    Referral for :    Optometry    LOCATION/PLACE/Provider :    [facility]   DATE & TIME :    Feb. 18th at 10:30 am   PHONE :     [phone]  FAX :    [phone]  Appointment made by clinic staff/:    Karla    Referral for :     Dexa Scan    LOCATION/PLACE/Provider :    [facility]   DATE & TIME :    Feb. 12th at 10:30am   PHONE :     [phone]  FAX :    [phone]  Appointment instructions: No tums, rolaids or multi-vitamins 2 days prior and day of scan   Appointment made by clinic staff/:    Karla

## 2020-02-11 LAB — HCV AB SER QL: NEGATIVE

## 2020-02-12 LAB
QTF ANTIGEN TB1-NIL: 1.78 IU/ML
QTF ANTIGEN TB2-NIL: 1.88 IU/ML
QTF INTERPRETATION: ABNORMAL
QTF MITOGEN - NIL: 5.35 IU/ML
QTF NIL: 0.31 IU/ML
QTF RESULT: POSITIVE

## 2020-02-13 DIAGNOSIS — Z00.00 WELLNESS EXAMINATION: ICD-10-CM

## 2020-02-13 PROBLEM — M85.89 OSTEOPENIA OF MULTIPLE SITES: Status: ACTIVE | Noted: 2020-02-13

## 2020-02-13 RX ORDER — TRIAMCINOLONE ACETONIDE 1 MG/G
OINTMENT TOPICAL 3 TIMES DAILY
Qty: 80 G | Refills: 0 | Status: SHIPPED | OUTPATIENT
Start: 2020-02-13 | End: 2021-02-02

## 2020-02-13 NOTE — RESULT ENCOUNTER NOTE
Please call patient with result.  DEXA showed osteopenia but not osteoporosis.  She has some reduced bone density.  Dr Crowell recommends continuing active lifestyle and healthy diet that includes calcium and vitamin D

## 2020-02-26 ENCOUNTER — OFFICE VISIT (OUTPATIENT)
Dept: FAMILY MEDICINE | Facility: CLINIC | Age: 70
End: 2020-02-26
Payer: MEDICARE

## 2020-02-26 VITALS
OXYGEN SATURATION: 99 % | RESPIRATION RATE: 20 BRPM | DIASTOLIC BLOOD PRESSURE: 91 MMHG | HEART RATE: 75 BPM | TEMPERATURE: 97.8 F | SYSTOLIC BLOOD PRESSURE: 157 MMHG

## 2020-02-26 DIAGNOSIS — R76.12 POSITIVE QUANTIFERON-TB GOLD TEST: Primary | ICD-10-CM

## 2020-02-26 LAB
ALBUMIN SERPL BCP-MCNC: 3.6 G/DL (ref 3.5–5)
ALP SERPL-CCNC: 132 U/L (ref 45–120)
ALT SERPL W/O P-5'-P-CCNC: 18 U/L (ref 0–45)
AST SERPL-CCNC: 20 U/L (ref 0–40)
BILIRUB DIRECT SERPL-MCNC: 0.3 MG/DL (ref 0–0.5)
BILIRUB SERPL-MCNC: 1.1 MG/DL (ref 0–1)
PROT SERPL-MCNC: 7 G/DL (ref 6–8)

## 2020-02-26 NOTE — PROGRESS NOTES
HPI:       Sona Sanders is a 69 year old  female who presents to address the following concerns:    Follow up positive TB quant: -  Referred to health department  Needs CXR  Denies: fever, night sweats, cough, weight loss  Primary exposure likely adult     HTN:  -recently BP elevated in clinic   -reports taking BP medications today    Reports taht she gets stomach ache from taking her statin.  ASCVD 16.2%           PMHX:     Patient Active Problem List   Diagnosis     Caregiver burden     Cervical cancer screening-no further screening      Advance care planning     Bradycardia     Essential hypertension     Osteopenia of multiple sites-does not met threshold for treatment by FRAX       Current Outpatient Medications   Medication Sig Dispense Refill     hydrochlorothiazide (HYDRODIURIL) 12.5 MG tablet Take 2 tablets (25 mg) by mouth daily 90 tablet 1     losartan (COZAAR) 50 MG tablet Take 1 tablet (50 mg) by mouth daily 90 tablet 1     metroNIDAZOLE (METROCREAM) 0.75 % external cream Apply 1 g topically daily  11     omeprazole (PRILOSEC) 20 MG DR capsule Take 1 capsule (20 mg) by mouth daily 90 capsule 3     Ranitidine HCl (ZANTAC PO) Take 1 tablet by mouth daily as needed       triamcinolone (KENALOG) 0.1 % external ointment Apply topically 3 times daily When rash present 80 g 0          Allergies   Allergen Reactions     Lisinopril      rash       No results found for this or any previous visit (from the past 24 hour(s)).    Current Outpatient Medications   Medication     hydrochlorothiazide (HYDRODIURIL) 12.5 MG tablet     losartan (COZAAR) 50 MG tablet     metroNIDAZOLE (METROCREAM) 0.75 % external cream     omeprazole (PRILOSEC) 20 MG DR capsule     Ranitidine HCl (ZANTAC PO)     triamcinolone (KENALOG) 0.1 % external ointment     No current facility-administered medications for this visit.               Review of Systems:   ROS as described above.  Denies F/S/C/N/V/SOB/CP          Physical Exam:      Vitals:    02/26/20 1007   BP: (!) 157/91   Pulse: 75   Resp: 20   Temp: 97.8  F (36.6  C)   TempSrc: Oral   SpO2: 99%     There is no height or weight on file to calculate BMI.    BP Readings from Last 3 Encounters:   02/26/20 (!) 157/91   02/10/20 (!) 156/89   08/19/19 (!) 147/85         GEN: patient sitting comfortably in NAD  HEEN: Head is atraumatic, normocephalic, eyes anicteric, mucous membranes moist  CV: RRR w/o M/R/G  PULM: CTAB without w/r/r  ABD: soft, nontender, bowel sounds present  NEURO: Alert and oriented x3.  No focal motor abnormalities.  Face symmetric.  PSYCH: appropriate  SKIN: No rashes, bruising, or other lesions    CXR: clear.  No infiltrate identified    Assessment and Plan     1. 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 Patient without sx of disease.  Positive quanteferon gold.  Cannot rule out latent infection, but currently no evidence active disease.  Referral coordinated with [facility] today. Can be evaluated by local TB clinic for treatment of latent disease  - XR CHEST 2 VW  - amLODIPine (NORVASC) 5 MG tablet; Take 1 tablet (5 mg) by mouth daily  Dispense: 5 tablet; Refill: 1  - Hepatic Profile ([facility])    Patient with several questions about Tuberculosis which were answered today.      2. Uncontrolled HTN: encouraged continued use oral anthypertensives.  Reports taht BP controlled at adult .      Greater than 25 minutes of total time was spent with patient of which greater than 50% was spent on counseling and coordination of care.    Options for treatment and follow-up care were reviewed with the patient and/or guardian. Sona CARNEY Marilyn and/or guardian engaged in the decision making process and verbalized understanding of the options discussed and agreed with the final plan.    Renetta Crowell MD

## 2020-02-26 NOTE — NURSING NOTE
Due to patient being non-English speaking/uses sign language, an  was used for this visit. Only for face-to-face interpretation by an external agency, date and length of interpretation can be found on the scanned worksheet.     name: Jaun Dow  Agency: [employer]  Language: Hmong   Telephone number: [phone]  Type of interpretation: Face-to-face, spoken

## 2020-03-06 RX ORDER — AMLODIPINE BESYLATE 5 MG/1
5 TABLET ORAL DAILY
Qty: 5 TABLET | Refills: 1 | Status: SHIPPED | OUTPATIENT
Start: 2020-03-06 | End: 2020-03-10

## 2020-03-10 DIAGNOSIS — R76.12 POSITIVE QUANTIFERON-TB GOLD TEST: ICD-10-CM

## 2020-03-10 NOTE — TELEPHONE ENCOUNTER
Message to physician: amlodipine 5 mg  Please double check quantity. Pt is requesting 90 day supply.    Date of last visit: 2/26/2020     Date of next visit if scheduled: Visit date not found       Last Comprehensive Metabolic Panel:  Sodium   Date Value Ref Range Status   08/19/2019 137.0 133.0 - 144.0 mmol/L Final     Potassium   Date Value Ref Range Status   08/19/2019 3.5 3.4 - 5.3 mmol/L Final     Chloride   Date Value Ref Range Status   08/19/2019 97.0 94.0 - 109.0 mmol/L Final     Carbon Dioxide   Date Value Ref Range Status   08/19/2019 29.0 20.0 - 32.0 mmol/L Final     Glucose   Date Value Ref Range Status   08/19/2019 108.0 60.0 - 109.0 mg/dL Final     Urea Nitrogen   Date Value Ref Range Status   08/19/2019 10.0 7.0 - 30.0 mg/dL Final     Creatinine   Date Value Ref Range Status   08/19/2019 1.0 0.6 - 1.3 mg/dL Final     GFR Estimate   Date Value Ref Range Status   01/16/2017 >60 >60 mL/min/1.73m2 Final     Calcium   Date Value Ref Range Status   08/19/2019 9.4 8.5 - 10.4 mg/dL Final       BP Readings from Last 3 Encounters:   02/26/20 (!) 157/91   02/10/20 (!) 156/89   08/19/19 (!) 147/85       No results found for: A1C             Please complete refill and CLOSE ENCOUNTER.  Closing the encounter signifies the refill is complete.

## 2020-03-11 RX ORDER — AMLODIPINE BESYLATE 5 MG/1
5 TABLET ORAL DAILY
Qty: 90 TABLET | Refills: 3 | Status: SHIPPED | OUTPATIENT
Start: 2020-03-11 | End: 2020-06-30

## 2020-03-18 ENCOUNTER — ANESTHESIA - HEALTHEAST (OUTPATIENT)
Dept: SURGERY | Facility: AMBULATORY SURGERY CENTER | Age: 70
End: 2020-03-18

## 2020-04-17 ENCOUNTER — VIRTUAL VISIT (OUTPATIENT)
Dept: FAMILY MEDICINE | Facility: CLINIC | Age: 70
End: 2020-04-17
Payer: COMMERCIAL

## 2020-04-17 DIAGNOSIS — N39.41 URGE INCONTINENCE OF URINE: ICD-10-CM

## 2020-04-17 NOTE — PROGRESS NOTES
"Family Medicine Telephone Visit Note               Telephone Visit Consent   Patient was verbally read the following and verbal consent was obtained.    \"This telephone visit will be conducted via a call between you and your physician/provider. We have found that certain health care needs can be provided without the need for a physical exam.  This service lets us provide the care you need with a short phone conversation.  If a prescription is necessary we can send it directly to your pharmacy.  If lab work is needed we can place an order for that and you can then stop by our lab to have the test done at a later time.    Telephone visits are billed at different rates depending on your insurance coverage. During this emergency period, for some insurers they may be billed the same as an in-person visit.  Please reach out to your insurance provider with any questions.    If during the course of the call the physician/provider feels a telephone visit is not appropriate, you will not be charged for this service.\"    Name person giving consent: patient   Date verbal consent given:  4/17/2020  Time verbal consent given:  8:55 AM           No chief complaint on file.           HPI   Patients name: Sona  Appointment start time:  9:20    Patient presents today to discuss need forms.  There has been some confusion about polyps as well as pain ureters.  Patient reports history of mixed incontinence with urge predominance.  Symptoms include frequency and urgency inability to get the bathroom once urgency is noted.  Denies any symptoms of obstruction or loss of stream.  Denies infectious symptoms.  Reports that she has had multiple vaginal births in her life but that symptoms started in the last year or 2.      Current Outpatient Medications   Medication Sig Dispense Refill     amLODIPine (NORVASC) 5 MG tablet Take 1 tablet (5 mg) by mouth daily 90 tablet 3     hydrochlorothiazide (HYDRODIURIL) 12.5 MG tablet Take 2 tablets (25 " "mg) by mouth daily 90 tablet 1     losartan (COZAAR) 50 MG tablet Take 1 tablet (50 mg) by mouth daily 90 tablet 1     metroNIDAZOLE (METROCREAM) 0.75 % external cream Apply 1 g topically daily  11     omeprazole (PRILOSEC) 20 MG DR capsule Take 1 capsule (20 mg) by mouth daily 90 capsule 3     Ranitidine HCl (ZANTAC PO) Take 1 tablet by mouth daily as needed       triamcinolone (KENALOG) 0.1 % external ointment Apply topically 3 times daily When rash present 80 g 0     Allergies   Allergen Reactions     Lisinopril      rash              Review of Systems:     Denies fevers, sweats, chills, nausea, vomiting         Physical Exam:     There were no vitals taken for this visit.  Estimated body mass index is 28.01 kg/m  as calculated from the following:    Height as of 2/10/20: 1.485 m (4' 10.47\").    Weight as of 2/10/20: 61.8 kg (136 lb 3.2 oz).    Exam:  Constitutional: healthy, alert and no distress  Psychiatric: mentation appears normal and affect normal/bright    none        Assessment and Plan     1. Urge incontinence of urine  Forms edited and completed forpatient  Needs 60 liners and 5 \"depends\" type disposable diapers monthly      Refilled medications that would be required in the next 3 months.     After Visit Information:  Form faxed for patient    Appointment end time: 932  This is a telephone visit that took 12 minutes.    Patient Hmong speaking and visit completed with assist of professional .       Clinician location:  [facility]    Renetta Crowell MD    "

## 2020-04-26 PROBLEM — N39.41 URGE INCONTINENCE OF URINE: Status: ACTIVE | Noted: 2020-04-26

## 2020-05-14 ENCOUNTER — AMBULATORY - HEALTHEAST (OUTPATIENT)
Dept: SURGERY | Facility: AMBULATORY SURGERY CENTER | Age: 70
End: 2020-05-14

## 2020-05-14 ENCOUNTER — TELEPHONE (OUTPATIENT)
Dept: FAMILY MEDICINE | Facility: CLINIC | Age: 70
End: 2020-05-14

## 2020-05-14 DIAGNOSIS — Z01.818 PREOPERATIVE EXAMINATION: Primary | ICD-10-CM

## 2020-05-14 DIAGNOSIS — Z11.59 ENCOUNTER FOR SCREENING FOR OTHER VIRAL DISEASES: ICD-10-CM

## 2020-05-14 NOTE — TELEPHONE ENCOUNTER
Called via language line to reschedule her colonoscopy.   Notified patient that I will mail out the instructions and information to her as well. Also notified her that we have to have a covid test done before hand and she would have to self quarantine from that day till the procedure date. She acknowledged.

## 2020-05-14 NOTE — TELEPHONE ENCOUNTER
1. Preoperative examination  COVID testing per protocol before her colonoscopy.  - Asymptomatic COVID-19 Virus (Coronavirus) by PCR Nasopharyngeal swab; Future    Killian Tariq III, MD, FAAFP  [facility]  05/14/20 4:26 PM

## 2020-05-18 ENCOUNTER — OFFICE VISIT - HEALTHEAST (OUTPATIENT)
Dept: FAMILY MEDICINE | Facility: CLINIC | Age: 70
End: 2020-05-18

## 2020-05-18 DIAGNOSIS — Z11.59 ENCOUNTER FOR SCREENING FOR OTHER VIRAL DISEASES: ICD-10-CM

## 2020-05-18 ASSESSMENT — MIFFLIN-ST. JEOR: SCORE: 1037.99

## 2020-05-21 ENCOUNTER — SURGERY - HEALTHEAST (OUTPATIENT)
Dept: SURGERY | Facility: AMBULATORY SURGERY CENTER | Age: 70
End: 2020-05-21

## 2020-05-21 ENCOUNTER — TELEPHONE (OUTPATIENT)
Dept: FAMILY MEDICINE | Facility: CLINIC | Age: 70
End: 2020-05-21

## 2020-05-21 ASSESSMENT — MIFFLIN-ST. JEOR: SCORE: 1037.99

## 2020-05-21 NOTE — TELEPHONE ENCOUNTER
----- Message from Killian Tariq MD sent at 5/21/2020  7:48 AM CDT -----  Regarding: RE: Colonoscopy 5/21/20  It's negative. I was not here yesterday so I only just saw this. I really wish it had gone to the nurses yesterday.    Please reply all when the patient has been called.  ----- Message -----  From: Rajani Gaspar, [facility]  Sent: 5/20/2020   9:14 AM CDT  To: Killian Tariq MD  Subject: Colonoscopy 5/21/20                              Pt's colonoscopy procedure will be tomorrow, needs to know if covid 19 result came back yet. Got it done Monday 5/18/20. Please Call ASAP so she can prep for the procedure. Thanks

## 2020-05-21 NOTE — TELEPHONE ENCOUNTER
Called x 2 NA. Unable to LM on VM due to full.  Told pt yesterday that if she did not hear result from MD by the end of the day. Just prep for the procedure.

## 2020-06-30 ENCOUNTER — OFFICE VISIT (OUTPATIENT)
Dept: FAMILY MEDICINE | Facility: CLINIC | Age: 70
End: 2020-06-30
Payer: COMMERCIAL

## 2020-06-30 VITALS
DIASTOLIC BLOOD PRESSURE: 79 MMHG | TEMPERATURE: 97.8 F | WEIGHT: 139 LBS | HEIGHT: 58 IN | HEART RATE: 70 BPM | SYSTOLIC BLOOD PRESSURE: 151 MMHG | OXYGEN SATURATION: 98 % | RESPIRATION RATE: 20 BRPM | BODY MASS INDEX: 29.18 KG/M2

## 2020-06-30 DIAGNOSIS — R60.0 LOWER EXTREMITY EDEMA: Primary | ICD-10-CM

## 2020-06-30 DIAGNOSIS — I10 HYPERTENSION, UNSPECIFIED TYPE: ICD-10-CM
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
## 2020-06-30 LAB
BNP SERPL-MCNC: <10 PG/ML (ref 0–117)
BUN SERPL-MCNC: 14 MG/DL (ref 7–30)
CALCIUM SERPL-MCNC: 9.4 MG/DL (ref 8.5–10.4)
CHLORIDE SERPLBLD-SCNC: 104 MMOL/L (ref 94–109)
CO2 SERPL-SCNC: 25 MMOL/L (ref 20–32)
CREAT SERPL-MCNC: 1 MG/DL (ref 0.6–1.3)
EGFR CALCULATED (BLACK REFERENCE): 70.7 ML/MIN
EGFR CALCULATED (NON BLACK REFERENCE): 58.4 ML/MIN
GLUCOSE SERPL-MCNC: 103 MG/DL (ref 60–109)
POTASSIUM SERPL-SCNC: 3.8 MMOL/L (ref 3.4–5.3)
SODIUM SERPL-SCNC: 143 MMOL/L (ref 133–144)

## 2020-06-30 RX ORDER — HYDROCHLOROTHIAZIDE 12.5 MG/1
25 TABLET ORAL DAILY
Qty: 90 TABLET | Refills: 1 | Status: SHIPPED | OUTPATIENT
Start: 2020-06-30 | End: 2020-09-18

## 2020-06-30 RX ORDER — HYDROCORTISONE VALERATE 2 MG/G
OINTMENT TOPICAL 2 TIMES DAILY
Qty: 60 G | Refills: 1 | Status: SHIPPED | OUTPATIENT
Start: 2020-06-30

## 2020-06-30 RX ORDER — LOSARTAN POTASSIUM 50 MG/1
50 TABLET ORAL DAILY
Qty: 90 TABLET | Refills: 1 | Status: SHIPPED | OUTPATIENT
Start: 2020-06-30 | End: 2021-02-02

## 2020-06-30 ASSESSMENT — MIFFLIN-ST. JEOR: SCORE: 1045.25

## 2020-06-30 NOTE — NURSING NOTE
Due to patient being non-English speaking/uses sign language, an  was used for this visit. Only for face-to-face interpretation by an external agency, date and length of interpretation can be found on the scanned worksheet.     name:Yasmine  Agency: [employer]  Language: Zora   Telephone number:  [phone]   Type of interpretation: Telephone, spoken

## 2020-06-30 NOTE — LETTER
July 3, 2020      Sona Sanders  [id number] [address] [place] [id number]        Dear Sona,    Please see below for your test results.  THey are stable.  There is no evidence that your heart is stressed and your kidneys are functioning as they were previously.     Resulted Orders   BNP ([facility])   Result Value Ref Range    BNP <10 0 - 117 pg/mL    Narrative    Test performed by:  [facility]  [address] [place] [address]   Basic Metabolic [facility]) - Results < 1 hr   Result Value Ref Range    Glucose 103.0 60.0 - 109.0 mg/dL    Urea Nitrogen 14.0 7.0 - 30.0 mg/dL    Creatinine 1.0 0.6 - 1.3 mg/dL    Sodium 143.0 133.0 - 144.0 mmol/L    Potassium 3.8 3.4 - 5.3 mmol/L    Chloride 104.0 94.0 - 109.0 mmol/L    Carbon Dioxide 25.0 20.0 - 32.0 mmol/L    Calcium 9.4 8.5 - 10.4 mg/dL    eGFR Calculated (Non Black Reference) 58.4 (L) >60.0 mL/min    eGFR Calculated (Black Reference) 70.7 >60.0 mL/min       If you have any questions, please call the clinic to make an appointment.    Sincerely,    Renetta Crowell MD

## 2020-06-30 NOTE — PROGRESS NOTES
"       HPI:       Sona Sanders is a 70 year old female who presents to address the following concerns:    Fluid Retention:  For the past month has had swelling in her feet that worsens throughout the day, a little worse in the left as compared to the right. No pain. She brings in photos of her legs at the end of the day showing significant swelling in her ankles. This completely resolves when she wakes up in the morning. Has never worn compression socks, has never had this problem before. No trouble breathing, but very tired, low energy, hands tight/disfigured (cannot fully bend pointer finger) but no pain. No orthopnea or nocturnal dyspnea. Does not have a scale at home, but feels like she has gained weight even though her diet has remained the same or she is eating less (we have weight of 136 pounds recorded for February). No unusual skin changes, no bruising. Hands have been itchy at times.    Medication Adherence:  Amlodipine 5 mg daily  Omeprazole 20 mg daily  Ranitidine as needed, not often   Hydrochlorothiazide & losartan - stopped because they were \"not working as well\"  Triamcinolone cream - stopped  Metronidazole cream - stopped         PMHX:     Patient Active Problem List   Diagnosis     Caregiver burden     Cervical cancer screening-no further screening      Advance care planning     Bradycardia     Essential hypertension     Osteopenia of multiple sites-does not met threshold for treatment by FRAX     Urge incontinence of urine       Current Outpatient Medications   Medication Sig Dispense Refill     amLODIPine (NORVASC) 5 MG tablet Take 1 tablet (5 mg) by mouth daily 90 tablet 3     omeprazole (PRILOSEC) 20 MG DR capsule Take 1 capsule (20 mg) by mouth daily 90 capsule 3     Ranitidine HCl (ZANTAC PO) Take 1 tablet by mouth daily as needed       hydrochlorothiazide (HYDRODIURIL) 12.5 MG tablet Take 2 tablets (25 mg) by mouth daily (Patient not taking: Reported on 4/17/2020) 90 tablet 1     losartan " "(COZAAR) 50 MG tablet Take 1 tablet (50 mg) by mouth daily (Patient not taking: Reported on 4/17/2020) 90 tablet 1     metroNIDAZOLE (METROCREAM) 0.75 % external cream Apply 1 g topically daily  11     triamcinolone (KENALOG) 0.1 % external ointment Apply topically 3 times daily When rash present (Patient not taking: Reported on 6/30/2020) 80 g 0          Allergies   Allergen Reactions     Lisinopril      rash       No results found for this or any previous visit (from the past 24 hour(s)).    Current Outpatient Medications   Medication     amLODIPine (NORVASC) 5 MG tablet     omeprazole (PRILOSEC) 20 MG DR capsule     Ranitidine HCl (ZANTAC PO)     hydrochlorothiazide (HYDRODIURIL) 12.5 MG tablet     losartan (COZAAR) 50 MG tablet     metroNIDAZOLE (METROCREAM) 0.75 % external cream     triamcinolone (KENALOG) 0.1 % external ointment     No current facility-administered medications for this visit.               Review of Systems:   ROS as described above.  Denies F/S/C/N/V/SOB/CP.          Physical Exam:     Vitals:    06/30/20 0937   BP: (!) 163/81   Pulse: 70   Resp: 20   Temp: 97.8  F (36.6  C)   SpO2: 98%   Weight: 63 kg (139 lb)   Height: 1.473 m (4' 10\")     Body mass index is 29.05 kg/m .  Weight is 3 pounds higher than when I saw her in February.  On recheck BP was 150/86.     GEN: patient sitting comfortably in NAD  HEEN: Head is atraumatic, normocephalic, eyes anicteric, mucous membranes moist  CV: RRR w/o M/R/G  PULM: CTAB without w/r/r  NEURO: Alert and oriented x3.  No focal motor abnormalities.  Face symmetric.  PSYCH: appropriate  EXTREM: trace edema bilateral lower extremities  SKIN: small lesion \"from tick bite\" on inner left ankle that is itchy. No surrounding bulls eye rash.    No results found for any visits on 06/30/20.    Assessment and Plan     Lower Extremity Edema  - Symptoms sound consistent with venous stasis, however could be due to side effect of Amlodipine, so will switch her back to " hydrochlorothiazide (25 mg) and losartan (50 mg). Less likely CHF or renal failure, but will check BNP & BMP since she is in clinic today.    Bug Bite  - hydrocortisone cream (sent Rx)    Recheck via telephone visit in 2 weeks.    Options for treatment and follow-up care were reviewed with the patient and/or guardian. Sona Sanders and/or guardian engaged in the decision making process and verbalized understanding of the options discussed and agreed with the final plan.    Tiffany Gandhi, MS4  [employer]    In supervising the medical student, I saw and evaluated the patient with the student and personally performed all aspects of the history and physical.  I have reviewed and verified that the documentation is correct and complete.    Renetta Crowell MD

## 2020-07-21 ENCOUNTER — TELEPHONE (OUTPATIENT)
Dept: FAMILY MEDICINE | Facility: CLINIC | Age: 70
End: 2020-07-21

## 2020-07-21 ENCOUNTER — VIRTUAL VISIT (OUTPATIENT)
Dept: FAMILY MEDICINE | Facility: CLINIC | Age: 70
End: 2020-07-21
Payer: COMMERCIAL

## 2020-07-21 DIAGNOSIS — I10 HYPERTENSION, UNSPECIFIED TYPE: Primary | ICD-10-CM

## 2020-07-21 NOTE — PROGRESS NOTES
"Family Medicine Telephone Visit Note         Telephone Visit Consent   Patient was verbally read the following and verbal consent was obtained.    \"Telephone visits are billed at different rates depending on your insurance coverage. During this emergency period, for some insurers they may be billed the same as an in-person visit.  Please reach out to your insurance provider with any questions.  If during the course of the call the physician/provider feels a telephone visit is not appropriate, you will not be charged for this service.\"    Name person giving consent:  Patient   Date verbal consent given:  7/21/2020  Time verbal consent given:  2:28 PM     Chief Complaint   Patient presents with     Leg Swelling     follow  up, onset x 1month      Medication Reconciliation     not completed doesnt know names         Due to patient being non-English speaking/uses sign language, an  was used for this visit. Only for face-to-face interpretation by an external agency, date and length of interpretation can be found on the scanned worksheet.     name: ID [id number]  Agency: [employer] - telephone  Language: [place]   Telephone number: toll free line  Type of interpretation: Telephone, spoken         HPI   Patients name: Sona  Appointment start time:  2:51    Taking  25mg hydrochlorothiazide now.  Stopped amlodipine.  Has Losartan at home but has not been taking.  Was not her understanding that she should be taking it.  Blood pressures are being checked at home.  Most recent was 130-140s/80-90s.  Taking blood pressure medications in the morning and together.        Leg swelling resolved with dicontinuation of amlodipine.      Current Outpatient Medications   Medication Sig Dispense Refill     hydrochlorothiazide (HYDRODIURIL) 12.5 MG tablet Take 2 tablets (25 mg) by mouth daily 90 tablet 1     hydrocortisone valerate (WEST-LYNN) 0.2 % external ointment Apply topically 2 times daily 60 g 1     losartan " "(COZAAR) 50 MG tablet Take 1 tablet (50 mg) by mouth daily 90 tablet 1     omeprazole (PRILOSEC) 20 MG DR capsule Take 1 capsule (20 mg) by mouth daily 90 capsule 3     triamcinolone (KENALOG) 0.1 % external ointment Apply topically 3 times daily When rash present (Patient not taking: Reported on 6/30/2020) 80 g 0     Allergies   Allergen Reactions     Lisinopril      rash              Review of Systems:     Denies fevers, sweats, chiills, nausea, vomiting.          Physical Exam:     There were no vitals taken for this visit.  Estimated body mass index is 29.05 kg/m  as calculated from the following:    Height as of 6/30/20: 1.473 m (4' 10\").    Weight as of 6/30/20: 63 kg (139 lb).    Exam:  Constitutional: healthy, alert and no distress  Psychiatric: mentation appears normal and affect normal/bright  Describes legs as without edema.          Assessment and Plan   Leg swelling:  -resolved with discontinuation of amlodipine    HTN:  -instructed in use of antihypertensives.  Will start taking losartan at night  -continue checking BP at home  -will place future order for BMP with medication change.     Refilled medications that would be required in the next 3 months.     After Visit Information:  None    Appointment end time: 3:40  This is a telephone visit that took 15 minutes.      Clinician location:  [facility]     Renetta Crowell MD  I  "

## 2020-07-21 NOTE — TELEPHONE ENCOUNTER
----- Message from Renetta Crowell MD sent at 7/21/2020  3:03 PM CDT -----  Please call patient.      Hope needs lab only visit.   needs office visit for fatigue this week.  Will need to be with another available provider.     Thanks so much,     Dr Crowell

## 2020-07-24 DIAGNOSIS — I10 HYPERTENSION, UNSPECIFIED TYPE: ICD-10-CM

## 2020-07-24 LAB
BUN SERPL-MCNC: 20 MG/DL (ref 7–30)
CALCIUM SERPL-MCNC: 9.2 MG/DL (ref 8.5–10.4)
CHLORIDE SERPLBLD-SCNC: 100 MMOL/L (ref 94–109)
CO2 SERPL-SCNC: 27 MMOL/L (ref 20–32)
CREAT SERPL-MCNC: 0.8 MG/DL (ref 0.6–1.3)
EGFR CALCULATED (BLACK REFERENCE): >90 ML/MIN
EGFR CALCULATED (NON BLACK REFERENCE): 75.6 ML/MIN
GLUCOSE SERPL-MCNC: 119 MG/DL (ref 60–109)
POTASSIUM SERPL-SCNC: 3.5 MMOL/L (ref 3.4–5.3)
SODIUM SERPL-SCNC: 141 MMOL/L (ref 133–144)

## 2020-09-18 DIAGNOSIS — I10 HYPERTENSION, UNSPECIFIED TYPE: ICD-10-CM

## 2020-09-18 NOTE — TELEPHONE ENCOUNTER
[facility] Family Medicine phone call message- medication clarification/question:    Full Medication Name: hydrochlorothiazide   Strength: 12.5 mg    Have you contacted your pharmacy about this refill request?     If  Yes,  which pharmacy?    When did you contact the pharmacy?    Additional comments/concerns from call to pharmacy:    Reason for call to clinic: Calling to get refill above, she states she is going out of town and needs a month refill before she leaves, she will be leaving this weekend. Told her it could take up to two business days for a response. Please call and advise.       Pharmacy confirmed as [facility][id number] - [facility] [address] [address] [facility] [address] [facility] [address] AT [facility] [address]: Yes    OK to leave a message on voice mail?     Primary language: Hmong      needed? Yes    Call taken on September 18, 2020 at 9:02 AM by Prasad Coleman

## 2020-09-21 RX ORDER — HYDROCHLOROTHIAZIDE 12.5 MG/1
25 TABLET ORAL DAILY
Qty: 90 TABLET | Refills: 1 | Status: SHIPPED | OUTPATIENT
Start: 2020-09-21 | End: 2021-01-26

## 2020-10-08 ENCOUNTER — DOCUMENTATION ONLY (OUTPATIENT)
Dept: FAMILY MEDICINE | Facility: CLINIC | Age: 70
End: 2020-10-08

## 2020-10-08 NOTE — PROGRESS NOTES
Telephone call to the client's home for annual health risk assessment.  An  was not needed. Assessment completed by responsible party.    Current situation/living environment  Lives with spouse and adult children    Activities of daily living (ADL)/instrumental activities of daily living (IADL) and functional issues  Client needs help with the following ADL's: none  Client needs help with the following IADL's: shopping, housekeeping, laundry and managing finances/bills  Client states she is unable to perform the above due to general wekaness      Health concerns for today  None  Has patient fallen 2 or more times in the last year? No  Has patient fallen with injury in the last year? No    Cognition/mental health  No concerns    STARS/Med Adherence  Client is non-compliant with the following quality measures: Colon cancer screening  Comments: education efforts    Client's Plan of Care consists of:  Adult day care (5 days per week) once returns and Homemaker services (3 hours per week)

## 2020-12-22 DIAGNOSIS — K21.00 GASTROESOPHAGEAL REFLUX DISEASE WITH ESOPHAGITIS: ICD-10-CM

## 2020-12-22 DIAGNOSIS — R10.11 RIGHT UPPER QUADRANT ABDOMINAL PAIN: ICD-10-CM

## 2021-01-25 DIAGNOSIS — I10 HYPERTENSION, UNSPECIFIED TYPE: ICD-10-CM

## 2021-01-25 DIAGNOSIS — R10.11 RIGHT UPPER QUADRANT ABDOMINAL PAIN: ICD-10-CM

## 2021-01-25 NOTE — TELEPHONE ENCOUNTER
[facility] Family Medicine phone call message- patient requesting a refill:    Full Medication Name:     omeprazole (PRILOSEC)     hydrochlorothiazide (HYDRODIURIL)    Dose: 20 mg dr capsule/ 12.5 mg tablet    Pharmacy confirmed as   [facility] #[id number] - [place], [place] - [address] [address] [facility]  [address] [facility] [address]  Phone: [phone] Fax: [phone]  : Yes    Additional Comments: Patient is wanting to know if she needs to continue to take the  hydrochlorothiazide as that medication typically does not have refills. If patient no longer needs to continue medication no need to send refills, if patient does need to continue it send medication and call when ready to .    OK to leave a message on voice mail? Yes    Primary language: ong      needed? Yes    Call taken on January 25, 2021 at 2:03 PM by Macey Coleman

## 2021-01-26 RX ORDER — HYDROCHLOROTHIAZIDE 12.5 MG/1
25 TABLET ORAL DAILY
Qty: 30 TABLET | Refills: 0 | Status: SHIPPED | OUTPATIENT
Start: 2021-01-26 | End: 2021-02-24

## 2021-02-01 ENCOUNTER — MEDICAL CORRESPONDENCE (OUTPATIENT)
Dept: HEALTH INFORMATION MANAGEMENT | Facility: CLINIC | Age: 71
End: 2021-02-01

## 2021-02-02 ENCOUNTER — OFFICE VISIT (OUTPATIENT)
Dept: FAMILY MEDICINE | Facility: CLINIC | Age: 71
End: 2021-02-02
Payer: COMMERCIAL

## 2021-02-02 VITALS
DIASTOLIC BLOOD PRESSURE: 84 MMHG | OXYGEN SATURATION: 99 % | HEIGHT: 58 IN | BODY MASS INDEX: 29.14 KG/M2 | TEMPERATURE: 98.5 F | WEIGHT: 138.8 LBS | SYSTOLIC BLOOD PRESSURE: 152 MMHG | HEART RATE: 58 BPM

## 2021-02-02 DIAGNOSIS — I10 HYPERTENSION, UNSPECIFIED TYPE: ICD-10-CM

## 2021-02-02 DIAGNOSIS — I10 ESSENTIAL HYPERTENSION: Primary | ICD-10-CM

## 2021-02-02 PROCEDURE — 99213 OFFICE O/P EST LOW 20 MIN: CPT | Mod: GC | Performed by: STUDENT IN AN ORGANIZED HEALTH CARE EDUCATION/TRAINING PROGRAM

## 2021-02-02 RX ORDER — LOSARTAN POTASSIUM 100 MG/1
50 TABLET ORAL DAILY
Qty: 90 TABLET | Refills: 0 | Status: SHIPPED | OUTPATIENT
Start: 2021-02-02 | End: 2021-02-02

## 2021-02-02 RX ORDER — LOSARTAN POTASSIUM 100 MG/1
100 TABLET ORAL DAILY
Qty: 90 TABLET | Refills: 0 | Status: SHIPPED | OUTPATIENT
Start: 2021-02-02 | End: 2021-05-05

## 2021-02-02 ASSESSMENT — MIFFLIN-ST. JEOR: SCORE: 1041.72

## 2021-02-02 NOTE — PROGRESS NOTES
HPI:       Sona Sanders is a 70 year old  female who presents to address the following concerns:    BP check  -this morning took 25mg of hydrochlorothiazide  -takes losartan 50mg at night    Med refill:  -sometimes when she takes her medicine she feels a bloating sensation in her stomach.  Does take omeprazole for GERD.  Unclear if this is daily or as needed.    HCM:  -needs review next visit         PMHX:     Patient Active Problem List   Diagnosis     Caregiver burden     Cervical cancer screening-no further screening      Advance care planning     Bradycardia     Essential hypertension     Osteopenia of multiple sites-does not met threshold for treatment by FRAX     Urge incontinence of urine       Current Outpatient Medications   Medication Sig Dispense Refill     hydrochlorothiazide (HYDRODIURIL) 12.5 MG tablet Take 2 tablets (25 mg) by mouth daily 30 tablet 0     hydrocortisone valerate (WEST-LYNN) 0.2 % external ointment Apply topically 2 times daily 60 g 1     losartan (COZAAR) 50 MG tablet Take 1 tablet (50 mg) by mouth daily 90 tablet 1     omeprazole (PRILOSEC) 20 MG DR capsule Take 1 capsule (20 mg) by mouth daily 90 capsule 0     triamcinolone (KENALOG) 0.1 % external ointment Apply topically 3 times daily When rash present (Patient not taking: Reported on 6/30/2020) 80 g 0          Allergies   Allergen Reactions     Lisinopril      rash       No results found for this or any previous visit (from the past 24 hour(s)).    Current Outpatient Medications   Medication     hydrochlorothiazide (HYDRODIURIL) 12.5 MG tablet     hydrocortisone valerate (WEST-LYNN) 0.2 % external ointment     losartan (COZAAR) 50 MG tablet     omeprazole (PRILOSEC) 20 MG DR capsule     triamcinolone (KENALOG) 0.1 % external ointment     No current facility-administered medications for this visit.               Review of Systems:   ROS as described above.  Denies F/S/C/N/V/SOB/CP          Physical Exam:     BP (!) 152/84 (BP  "Location: Right arm, Patient Position: Sitting, Cuff Size: Adult Regular)   Pulse 58   Temp 98.5  F (36.9  C) (Oral)   Ht 1.469 m (4' 9.84\")   Wt 63 kg (138 lb 12.8 oz)   SpO2 99%   BMI 29.17 kg/m      GEN: patient sitting comfortably in NAD  HEEN: Head is atraumatic, normocephalic, eyes anicteric, mucous membranes moist  CV: RRR w/o M/R/G  PULM: CTAB without w/r/r  ABD: soft, nontender, bowel sounds present  NEURO: Alert and oriented x3.  No focal motor abnormalities.  Face symmetric.  PSYCH: appropriate  SKIN: No rashes, bruising, or other lesions      Assessment and Plan      1. Essential hypertension-uncontrolled today.  Will increase dose of hydrochlorothiazide to 100mg daily and recheck in 2 weeks.  BMP at that visit.   - losartan (COZAAR) 100 MG tablet; Take 0.5 tablets (50 mg) by mouth daily  Dispense: 90 tablet; Refill: 0    Renetta Crowell MD      Options for treatment and follow-up care were reviewed with the patient and/or guardian. Sona Sanders and/or guardian engaged in the decision making process and verbalized understanding of the options discussed and agreed with the final plan.    Renetta Crowell MD            "

## 2021-02-02 NOTE — NURSING NOTE
Due to patient being non-English speaking/uses sign language, an  was used for this visit. Only for face-to-face interpretation by an external agency, date and length of interpretation can be found on the scanned worksheet.     name: tasha  Agency: [employer]  Language: Hmong   Telephone number:    Type of interpretation: Telephone, spoken

## 2021-02-03 ENCOUNTER — TELEPHONE (OUTPATIENT)
Dept: FAMILY MEDICINE | Facility: CLINIC | Age: 71
End: 2021-02-03

## 2021-02-03 NOTE — TELEPHONE ENCOUNTER
[facility] Family Medicine phone call message- general phone call:    Reason for call: Patient has question regarding medications. Patient was in 2/02 and was advise to take new medication for 2 weeks but is uncertain with other medications as she state she had a lot of refills. Did not disclose which medications she is referring to. Please call and advise.     Return call needed: Yes    OK to leave a message on voice mail? Yes    Primary language: [facility]      needed? Yes    Call taken on February 3, 2021 at 11:19 AM by Bro Sanders

## 2021-02-03 NOTE — TELEPHONE ENCOUNTER
Hope informed to take Losartan 100 mg 1 tablet once a day and hydrochlorothiazide 12.5 mg taking 2 tablets once a day. Will plan on following up in 2 weeks for BMP as per Dr Crowell. Evelyn BURNETT

## 2021-02-03 NOTE — TELEPHONE ENCOUNTER
Upon further review Dr Crowell's progress note, in plan it notes will increase Hydrochlorothiazide to 100 mg. Previously was taking hydrochlorothiazide 12.5 mg. Losartan she was taking 50mg once daily. Dr Crowell had written for 100 mg of Losartan but direction says take half a tablet= 50mg once a day. Please clarify, will obtain clarification prior to calling patient back. Thank you. Evelyn BURNETT

## 2021-02-05 ENCOUNTER — MEDICAL CORRESPONDENCE (OUTPATIENT)
Dept: HEALTH INFORMATION MANAGEMENT | Facility: CLINIC | Age: 71
End: 2021-02-05

## 2021-02-23 ENCOUNTER — OFFICE VISIT (OUTPATIENT)
Dept: FAMILY MEDICINE | Facility: CLINIC | Age: 71
End: 2021-02-23
Payer: COMMERCIAL

## 2021-02-23 VITALS
RESPIRATION RATE: 20 BRPM | HEIGHT: 58 IN | SYSTOLIC BLOOD PRESSURE: 138 MMHG | WEIGHT: 138.6 LBS | OXYGEN SATURATION: 99 % | DIASTOLIC BLOOD PRESSURE: 80 MMHG | HEART RATE: 68 BPM | TEMPERATURE: 97.6 F | BODY MASS INDEX: 29.09 KG/M2

## 2021-02-23 DIAGNOSIS — I10 ESSENTIAL HYPERTENSION: ICD-10-CM

## 2021-02-23 LAB
BUN SERPL-MCNC: 16 MG/DL (ref 7–30)
CALCIUM SERPL-MCNC: 9.5 MG/DL (ref 8.5–10.4)
CHLORIDE SERPLBLD-SCNC: 104 MMOL/L (ref 94–109)
CO2 SERPL-SCNC: 27 MMOL/L (ref 20–32)
CREAT SERPL-MCNC: 0.8 MG/DL (ref 0.6–1.3)
EGFR CALCULATED (BLACK REFERENCE): >90 ML/MIN
EGFR CALCULATED (NON BLACK REFERENCE): 75.6 ML/MIN
GLUCOSE SERPL-MCNC: 96 MG/DL (ref 60–109)
POTASSIUM SERPL-SCNC: 3.8 MMOL/L (ref 3.4–5.3)
SODIUM SERPL-SCNC: 143 MMOL/L (ref 133–144)

## 2021-02-23 PROCEDURE — 80048 BASIC METABOLIC PNL TOTAL CA: CPT | Performed by: STUDENT IN AN ORGANIZED HEALTH CARE EDUCATION/TRAINING PROGRAM

## 2021-02-23 PROCEDURE — 99213 OFFICE O/P EST LOW 20 MIN: CPT | Performed by: STUDENT IN AN ORGANIZED HEALTH CARE EDUCATION/TRAINING PROGRAM

## 2021-02-23 PROCEDURE — 36415 COLL VENOUS BLD VENIPUNCTURE: CPT | Performed by: STUDENT IN AN ORGANIZED HEALTH CARE EDUCATION/TRAINING PROGRAM

## 2021-02-23 ASSESSMENT — MIFFLIN-ST. JEOR: SCORE: 1043.44

## 2021-02-23 NOTE — PROGRESS NOTES
HPI:       Sona Sanders is a 70 year old  female who presents to address the following concerns:    BP Follow up:  -patient with increase in intensity of BP regimen recently for elevated BPs  -Taking medications without issues  -BP's at home have been 140/80  -new regimen 25mg hydrochlorothiazide, Losartan 100mg daily  -wondering if she can split her pills.            PMHX:     Patient Active Problem List   Diagnosis     Caregiver burden     Cervical cancer screening-no further screening      Advance care planning     Bradycardia     Essential hypertension     Osteopenia of multiple sites-does not met threshold for treatment by FRAX     Urge incontinence of urine       Current Outpatient Medications   Medication Sig Dispense Refill     hydrochlorothiazide (HYDRODIURIL) 12.5 MG tablet Take 2 tablets (25 mg) by mouth daily 30 tablet 0     hydrocortisone valerate (WEST-LYNN) 0.2 % external ointment Apply topically 2 times daily 60 g 1     losartan (COZAAR) 100 MG tablet Take 1 tablet (100 mg) by mouth daily 90 tablet 0     omeprazole (PRILOSEC) 20 MG DR capsule Take 1 capsule (20 mg) by mouth daily 90 capsule 0          Allergies   Allergen Reactions     Lisinopril      rash       Results for orders placed or performed in visit on 02/23/21 (from the past 24 hour(s))   Basic Metabolic Panel ([facility])   Result Value Ref Range    Glucose 96.0 60.0 - 109.0 mg/dL    Urea Nitrogen 16.0 7.0 - 30.0 mg/dL    Creatinine 0.8 0.6 - 1.3 mg/dL    Sodium 143.0 133.0 - 144.0 mmol/L    Potassium 3.8 3.4 - 5.3 mmol/L    Chloride 104.0 94.0 - 109.0 mmol/L    Carbon Dioxide 27.0 20.0 - 32.0 mmol/L    Calcium 9.5 8.5 - 10.4 mg/dL    eGFR Calculated (Non Black Reference) 75.6 >60.0 mL/min    eGFR Calculated (Black Reference) >90 >60.0 mL/min       Current Outpatient Medications   Medication     hydrochlorothiazide (HYDRODIURIL) 12.5 MG tablet     hydrocortisone valerate (WEST-LYNN) 0.2 % external ointment     losartan (COZAAR) 100 MG  "tablet     omeprazole (PRILOSEC) 20 MG DR capsule     No current facility-administered medications for this visit.               Review of Systems:   ROS as described above.  Denies F/S/C/N/V/SOB/C          Physical Exam:     Vitals:    02/23/21 0828   BP: 138/80   Pulse: 68   Resp: 20   Temp: 97.6  F (36.4  C)   SpO2: 99%   Weight: 62.9 kg (138 lb 9.6 oz)   Height: 1.473 m (4' 10\")     Body mass index is 28.97 kg/m .    GEN: patient sitting comfortably in NAD  HEEN: Head is atraumatic, normocephalic, eyes anicteric, mucous membranes moist  CV: RRR w/o M/R/G  PULM: CTAB without w/r/r  ABD: soft, nontender, bowel sounds present  NEURO: Alert and oriented x3.  No focal motor abnormalities.  Face symmetric.  PSYCH: appropriate  SKIN: No rashes, bruising, or other lesions    Results for orders placed or performed in visit on 02/23/21   Basic Metabolic Panel ([facility])     Status: None   Result Value Ref Range    Glucose 96.0 60.0 - 109.0 mg/dL    Urea Nitrogen 16.0 7.0 - 30.0 mg/dL    Creatinine 0.8 0.6 - 1.3 mg/dL    Sodium 143.0 133.0 - 144.0 mmol/L    Potassium 3.8 3.4 - 5.3 mmol/L    Chloride 104.0 94.0 - 109.0 mmol/L    Carbon Dioxide 27.0 20.0 - 32.0 mmol/L    Calcium 9.5 8.5 - 10.4 mg/dL    eGFR Calculated (Non Black Reference) 75.6 >60.0 mL/min    eGFR Calculated (Black Reference) >90 >60.0 mL/min     Assessment and Plan     BP; controlled today in clinic  -will try taking losartan at night and hydrochlorothiazide in the morning  -BMP      Options for treatment and follow-up care were reviewed with the patient and/or guardian. Sona Sanders and/or guardian engaged in the decision making process and verbalized understanding of the options discussed and agreed with the final plan.    Renetta Crowell MD            "

## 2021-02-23 NOTE — NURSING NOTE
Due to patient being non-English speaking/uses sign language, an  was used for this visit. Only for face-to-face interpretation by an external agency, date and length of interpretation can be found on the scanned worksheet.     name: Jaun  Agency: [employer]  Language: [place]   Telephone number: [phone]  Type of interpretation: Telephone, spoken

## 2021-02-24 DIAGNOSIS — I10 HYPERTENSION, UNSPECIFIED TYPE: ICD-10-CM

## 2021-02-24 NOTE — TELEPHONE ENCOUNTER
[facility] Family Medicine phone call message- medication clarification/question:    Full Medication Name: hydrochlorothiazide (HYDRODIURIL) 12.5 MG tablet   Strength:     Have you contacted your pharmacy about this refill request?     If  Yes,  which pharmacy?    When did you contact the pharmacy?    Additional comments/concerns from call to pharmacy:    Reason for call to clinic: Patient is calling stating that she was suppose to get refill for medication but nothing was sent to the pharmacy so she is not sure if the doctor forgot to send it in? She was seen yesterday. Please call and advise.       Pharmacy confirmed as [id number] [address] [address] [facility] AT [facility] [address]: Yes    OK to leave a message on voice mail?     Primary language: Hmong      needed? Yes    Call taken on February 24, 2021 at 12:57 PM by Prasad Coleman

## 2021-02-24 NOTE — NURSING NOTE
Informed pt of her BMP result. MD forgot to forward to [facility] to call.

## 2021-03-01 RX ORDER — HYDROCHLOROTHIAZIDE 25 MG/1
25 TABLET ORAL DAILY
Qty: 90 TABLET | Refills: 0 | Status: SHIPPED | OUTPATIENT
Start: 2021-03-01

## 2021-04-26 DIAGNOSIS — R10.11 RIGHT UPPER QUADRANT ABDOMINAL PAIN: ICD-10-CM

## 2021-04-26 NOTE — TELEPHONE ENCOUNTER
Message to physician:     Date of last visit: 2/23/2021     Date of next visit if scheduled: none    Last Comprehensive Metabolic Panel:  Sodium   Date Value Ref Range Status   02/23/2021 143.0 133.0 - 144.0 mmol/L Final     Potassium   Date Value Ref Range Status   02/23/2021 3.8 3.4 - 5.3 mmol/L Final     Chloride   Date Value Ref Range Status   02/23/2021 104.0 94.0 - 109.0 mmol/L Final     Carbon Dioxide   Date Value Ref Range Status   02/23/2021 27.0 20.0 - 32.0 mmol/L Final     Glucose   Date Value Ref Range Status   02/23/2021 96.0 60.0 - 109.0 mg/dL Final     Urea Nitrogen   Date Value Ref Range Status   02/23/2021 16.0 7.0 - 30.0 mg/dL Final     Creatinine   Date Value Ref Range Status   02/23/2021 0.8 0.6 - 1.3 mg/dL Final     GFR Estimate   Date Value Ref Range Status   01/16/2017 >60 >60 mL/min/1.73m2 Final     Calcium   Date Value Ref Range Status   02/23/2021 9.5 8.5 - 10.4 mg/dL Final       BP Readings from Last 3 Encounters:   02/23/21 138/80   02/02/21 (!) 152/84   06/30/20 (!) 151/79       No results found for: A1C             Please complete refill and CLOSE ENCOUNTER.  Closing the encounter signifies the refill is complete.

## 2021-05-05 DIAGNOSIS — I10 HYPERTENSION, UNSPECIFIED TYPE: ICD-10-CM

## 2021-05-05 RX ORDER — LOSARTAN POTASSIUM 100 MG/1
100 TABLET ORAL DAILY
Qty: 90 TABLET | Refills: 1 | Status: SHIPPED | OUTPATIENT
Start: 2021-05-05

## 2021-06-04 VITALS — WEIGHT: 135 LBS | BODY MASS INDEX: 27.21 KG/M2 | HEIGHT: 59 IN

## 2021-09-22 ENCOUNTER — OFFICE VISIT (OUTPATIENT)
Dept: FAMILY MEDICINE | Facility: CLINIC | Age: 71
End: 2021-09-22
Payer: COMMERCIAL

## 2021-09-22 VITALS
DIASTOLIC BLOOD PRESSURE: 77 MMHG | RESPIRATION RATE: 22 BRPM | SYSTOLIC BLOOD PRESSURE: 128 MMHG | OXYGEN SATURATION: 99 % | TEMPERATURE: 98.1 F | BODY MASS INDEX: 29.34 KG/M2 | WEIGHT: 136 LBS | HEART RATE: 61 BPM | HEIGHT: 57 IN

## 2021-09-22 DIAGNOSIS — Z00.00 MEDICARE ANNUAL WELLNESS VISIT, SUBSEQUENT: ICD-10-CM

## 2021-09-22 DIAGNOSIS — Z91.89 AT RISK FOR BREAST CANCER: ICD-10-CM

## 2021-09-22 DIAGNOSIS — Z23 NEED FOR PROPHYLACTIC VACCINATION AND INOCULATION AGAINST INFLUENZA: Primary | ICD-10-CM

## 2021-09-22 DIAGNOSIS — Z12.31 ENCOUNTER FOR SCREENING MAMMOGRAM FOR MALIGNANT NEOPLASM OF BREAST: ICD-10-CM

## 2021-09-22 DIAGNOSIS — Z00.00 WELLNESS EXAMINATION: Primary | ICD-10-CM

## 2021-09-22 DIAGNOSIS — M25.561 CHRONIC PAIN OF RIGHT KNEE: ICD-10-CM

## 2021-09-22 DIAGNOSIS — G89.29 CHRONIC PAIN OF RIGHT KNEE: ICD-10-CM

## 2021-09-22 DIAGNOSIS — Z78.0 MENOPAUSE PRESENT: ICD-10-CM

## 2021-09-22 DIAGNOSIS — E78.5 HYPERLIPIDEMIA LDL GOAL <100: ICD-10-CM

## 2021-09-22 LAB
CHOLEST SERPL-MCNC: 233 MG/DL
FASTING STATUS PATIENT QL REPORTED: ABNORMAL
HDLC SERPL-MCNC: 59 MG/DL
LDLC SERPL CALC-MCNC: 140 MG/DL
TRIGL SERPL-MCNC: 172 MG/DL

## 2021-09-22 PROCEDURE — 80061 LIPID PANEL: CPT | Performed by: STUDENT IN AN ORGANIZED HEALTH CARE EDUCATION/TRAINING PROGRAM

## 2021-09-22 PROCEDURE — 36415 COLL VENOUS BLD VENIPUNCTURE: CPT | Performed by: STUDENT IN AN ORGANIZED HEALTH CARE EDUCATION/TRAINING PROGRAM

## 2021-09-22 PROCEDURE — G0439 PPPS, SUBSEQ VISIT: HCPCS | Mod: 25 | Performed by: STUDENT IN AN ORGANIZED HEALTH CARE EDUCATION/TRAINING PROGRAM

## 2021-09-22 PROCEDURE — G0008 ADMIN INFLUENZA VIRUS VAC: HCPCS | Performed by: STUDENT IN AN ORGANIZED HEALTH CARE EDUCATION/TRAINING PROGRAM

## 2021-09-22 PROCEDURE — 90662 IIV NO PRSV INCREASED AG IM: CPT | Performed by: STUDENT IN AN ORGANIZED HEALTH CARE EDUCATION/TRAINING PROGRAM

## 2021-09-22 PROCEDURE — 99207 PR NO BILLABLE SERVICE THIS VISIT: CPT

## 2021-09-22 RX ORDER — MULTIVIT WITH MINERALS/LUTEIN
1 TABLET ORAL DAILY
Qty: 90 TABLET | Refills: 3 | Status: SHIPPED | OUTPATIENT
Start: 2021-09-22

## 2021-09-22 ASSESSMENT — MIFFLIN-ST. JEOR: SCORE: 1018.39

## 2021-09-22 NOTE — PROGRESS NOTES
Medicare Wellness Visit  Health Risk Assessment           Health Risk Assessment / Review of Systems     Constitutional: Any fevers or night sweats? No     Eyes:  Vision problems   No- report right eye blurriness occasionally for past 3-4 months. Last eye exam with ophthalmology was last year.    Hearing Do you feel you have hearing loss?  No     Cardiovascular: Any chest pain, fast or irregular heart beat, calf pain with walking?     No           Respiratory:   Any breathing problems or cough?   No     Gastrointestinal: Any stomach or stool problems?   No      Genitourinary: Do you have difficulty controlling urination?   No     Muscles and Joints: Any joint stiffness or soreness?    YES - report right knee stiffness.    Skin: Any concerning lesions or moles?   No     Nervous System: Any loss of strength or feeling, numbness or tingling, shaking, dizziness, or headache?   YES - right leg pain for past 3-4 months unable to bend knee without discomfort when go up and down stairs. No known injury or trauma.    Mental Health: Any depression, anxiety or problems sleeping?    No     Cognition: Do you have any problems with your memory?   YES - noticed decrease in short term memory.           Medical Care     What other specialists or organizations are involved in your medical care?  NONE  Patient Care Team       Relationship Specialty Notifications Start End    Renetta Crowell MD PCP - General Family Practice  6/9/15     Phone: [phone] Fax: [phone]         [facility] [place] [address] [facility] [address]    Renetta Crowell MD Assigned PCP   4/11/21     Phone: [phone] Fax: [phone]         [facility] [place] [address] [facility] [address]          Have you been to the ER or overnight in the hospital in the last year?  No          Social History / Home Safety       Marital Status:  Who lives in your household?  and son    Do you feel threatened or controlled  by a partner, ex-partner or anyone in your life? No     Has anyone hurt you physically, for example by pushing, hitting, slapping or kicking you   or forcing you to have sex? No          Does your home have any of the following safety concerns; loose rugs in the hallway,  bathrooms with no grab bars by the tub or toilet, stairs with no handrails or poorly lit areas?   YES - no grab bars in bathroom but does not feel it is necessary this time. Resident is son's home, does not want to make any changes to his home but most importantly not needed at this time, uses shower chair.    Do you need help with dressing yourself, bathing, eating or getting around your home?  No     Do you need help with the phone, transportation, shopping, preparing meals, housework, laundry, medications or managing money?No       Risk Behaviors and Healthy Habits     History   Smoking Status     Never Smoker   Smokeless Tobacco     Never Used     How many servings of fruits and vegetables do you eat a day? Average intake of fruits and vegetables 3-4 servings a day.    Exercise: goes to adult day center 5 days a week. There Hope walks 1 mile and stretches for 30- 40 mins a day.     Do you frequently drive without a seatbelt? No     Do you use tobacco?  No     Do you use any other drugs? No         Do you use alcohol?No      Frailty Assessment            Have you lost 10 or more pounds unintentionally in the previous year? No     How difficult is walking from one room to the other on the same level?not       Is it difficult to lift or carry something as heavy as 10 pounds?not      Compared with most (men/women) your age, would you say  that you are more active, less active, or about the same?  Per patient unable to determine.        FALL RISK ASSESSMENT 9/22/2021 2/10/2020 5/24/2019   Fallen 2 or more times in the past year? No No No   Any fall with injury in the past year? No No No   Timed Up and Go Test/Seconds (13.5 is a fall risk; contact  physician) 11 9 -         Advance Directives:   Discussed with patient and family as appropriate. Has patient  completed advance directives and/or a living will? Yes- completed healthcare directive in 2016.       Meenakshi Gomez RN

## 2021-09-22 NOTE — PATIENT INSTRUCTIONS
PERSONAL PREVENTIVE SERVICES PLAN - SERVICES     Review these tests with your medical staff then decide which ones you want and take this page home for your reference      SCREENING TESTS     Description   Year of Last Screening   Recommended Today?   Heart disease screening blood tests    Cholesterol level Reducing cholesterol can reduce your risk of heart attacks by 25%.  Screening is recommended yearly if you are at risk of heart disease otherwise every 4-5 years 2/10/20 Yes; Recommended.   Diabetes screening tests    Hemoglobin A1c blood test   Finding and treating diabetes early can reduce complications.  Screening recommended/covered yearly if you have high blood pressure, high cholesterol, obesity (BMI >30), or a history of high blood glucose tests; or 2 of the following: family history of diabetes, overweight (BMI >25 but <30), age 65 years or older, and a history of diabetes of pregnancy or gave birth to baby weighing more than 9 lbs. 2/23/2021  BMP,glucose- 96.0 Yes; Recommended    Hepatitis B screening Finding hepatitis B early can reduce complications.  Screening is recommended for persons with selected risk factors.  No: is not indicated today.   Hepatitis C screening Finding hepatitis C early can reduce complications.  Screening is recommended for all persons born from 1945 through 1965 and for those with selected other risk factors.  2/10/20  negative No: is not indicated today.   HIV screening Finding HIV early can reduce complications.  Screening is recommended for persons with risk factors for HIV infection.  No: is not indicated today.   Glaucoma screening Early detection of glaucoma can prevent blindness.   Please talk to your eye doctor about this.       SCREENING TESTS     Description   Year of Last Screening   Recommended Today?   Colorectal cancer screening    Fecal occult blood test     Screening colonoscopy Screening for colon cancer has been shown to reduce death from colon cancer by  25-30%. Screening recommended to start at 50 years and continuing until age 75 years.   5/28/20  Several polyps found Recommended repeat in 2023   Breast Cancer Screening (women)    Mammogram Mammogram screening for breast cancer has been shown to reduce the risk of dying from breast cancer and prolong life. Screening is recommended every 1-2 years for women aged 50 to 74 years.  7/10/2019  negative Yes; Recommended   Cervical Cancer screening (women)    Pap Cervical pap smears can reduce cervical cancer. Screening is recommended annually if high risk (history of abnormal pap smears) otherwise every 2-3 years, stop screening at 65 years of age if history of normal paps. 6/27/2016  normal No: is not indicated today.   Screening for Osteoporosis:  Bone mass measurements (women)    Dexa Scan Screening and treating Osteoporosis can reduce the risk of hip and spine fractures. Screening is recommended in women 65 years or older and in women and men at risk of osteoporosis. 2/13/20  osteopenia No: is not indicated today.   Screening for Lung Cancer     Low-dose CT scanning Screening can reduce mortality in persons aged 55-80 who have smoked at least 30 pack-years and who are either still smoking or have quit in the past 15 years.  No: is not indicated today.   Abdominal Aortic Aneurysm (AAA) screening    Ultrasound (US)   An aneurysm treated before rupture is very safe -a ruptured aneurysm can be fatal.  Screening  by US for AAA is limited to patients who meet one of the following criteria:    Men who are 65-75 years old and have smoked more than 100 cigarettes in their lifetime    Anyone with a family history of abdominal aortic aneurysm  No: is not indicated today.     Here are your recommended immunizations.  Take this home for your reference.                                                    IMMUNIZATIONS Description Recommend today?     Influenza (Flu shot) Prevents flu; should get every year Yes; Recommended    PCV  13 Pneumonia vaccination; you get it once No; is up to date.   PPSV 23 Second pneumonia vaccination; usually get it 1 year after PCV 13 No; is up to date.   Zoster (Shingles) Prevents shingles; you get it once  (Check with Part D insurance for coverage, must receive at a pharmacy, not clinic) Yes; Recommended    Tetanus Prevents tetanus; once every 10 years DUE last one 2006      Hepatitis B  If you have any of the following risk factors you should be immunized for hepatitis B: severe kidney disease, people who live in the same house as a carrier of Hepatitis B virus, people who live in  institutions (e.g. nursing homes or group homes), homosexual men, patients with hemophilia who received Factor VIII or IX concentrates, abusers of illicit injectable drugs No: is not indicated today.      PATIENT INSTRUCTIONS    Yearly exam:     See your health care provider every year in order to review changes in your health, review medicines that you take, and discuss preventive care needs such as immunizations and cancer screening.    Get a flu shot each year.     Advance Directives:    If you have not done so, you are encouraged to complete advance directives and/or a living will.   More information about advance directives can be found at: http://www.[place]med.org/advocacy/Key-Issues/Advance-Directives    Nutrition:     Eat at least 5 servings of fruits and vegetables each day.     Eat whole-grain bread, whole-wheat pasta and brown rice instead of white grains and rice.     Talk to your doctor about Calcium and Vitamin D.     Lifestyle:    Exercise for at least 150 minutes a week (30 minutes a day, 5 days a week). This will help you control your weight and prevent disease.     Limit alcohol to one drink per day.     If you smoke, try to quit - your doctor will be happy to help.     Wear sunscreen to prevent skin cancer.     See your dentist every six months for an exam and cleaning.     See your eye doctor every 1 to 2 years to  screen for conditions such as glaucoma, macular degeneration and cataracts.

## 2021-09-22 NOTE — PROGRESS NOTES
Annual Wellness Visit for 65 years and older    HPI  This 71 year old female presents as an established patient  Renetta Crowell MD   who presents for an annual wellness visit.    Honoring choices has been given to patient today.  She is having trouble electing a health care proxy but has been thinking about it.    I asked patient about her code status today and she reports that she wants to be saved at this time but does not want to be saved from a stroke.      Other issues patient wants to be addressed today:  Chief Complaint   Patient presents with     Wellness Visit     Imm/Inj     Flu Shot     Patient Active Problem List   Diagnosis     Caregiver burden     Cervical cancer screening-no further screening      Advance care planning     Bradycardia     Essential hypertension     Osteopenia of multiple sites-does not met threshold for treatment by FRAX     Urge incontinence of urine     Past Medical History:   Diagnosis Date     Hypertension      Inverted nipple during breast feeding     no longer      (normal spontaneous vaginal delivery)     x 6     PONV (postoperative nausea and vomiting)        Family History   Problem Relation Age of Onset     Hypertension Mother      Cerebrovascular Disease Mother      Coronary Artery Disease Mother      Hypertension Father      Cerebrovascular Disease Father      Cancer No family hx of      Heart Disease No family hx of      Diabetes No family hx of      Problem List, Family History and past Medical History reviewed and  Unchanged/updated.    Nursing Notes:   Rajani Gaspar, ESDRAS  2021 10:21 AM  Signed  Due to patient being non-English speaking/uses sign language, an  was used for this visit. Only for face-to-face interpretation by an external agency, date and length of interpretation can be found on the scanned worksheet.     name: Mag Gaspar  Agency: [employer]rin Abdullahi  Language: [facility]   Telephone number: [phone]  Type of interpretation:  Face-to-face, spoken          Are you sexually active?  No   If yes, with men, women, or both? n/a      FOR WOMEN  What year did you stop having periods? 50's  Any vaginal bleeding in the last year? No  Have you ever had an abnormal Pap smear? No      Frailty Assessment    1. Weight loss (>5% in year)  No  Wt Readings from Last 5 Encounters:   09/22/21 61.7 kg (136 lb)   02/23/21 62.9 kg (138 lb 9.6 oz)   02/02/21 63 kg (138 lb 12.8 oz)   06/30/20 63 kg (139 lb)   05/21/20 61.2 kg (135 lb)       2. Exhaustion (perceived effort for a given activity)   How difficult is walking from one room to the other on the same level?mildlyx.  This is because of pain in knees   No     3. Weakness (handgrip strength)   How difficult is lifting or carrying something as heavy as 10 pounds? mildly   Yes    4. Decreased physical activity    Compared with most (men/women) your age, would you say  that you are more active, less active, or about the same? same   No    5. Slow gait speed (timed up and go > 12 sec.)  No  FALL RISK ASSESSMENT 9/22/2021 2/10/2020 5/24/2019   Fallen 2 or more times in the past year? No No No   Any fall with injury in the past year? No No No   Timed Up and Go Test/Seconds (13.5 is a fall risk; contact physician) 11 9 -       Frailty screen score: 2    Frail Assessment:1-2 Prefrail        EVALUATION OF COGNITIVE FUNCTION  Mood/affect:Normal  Appearance:Normal  Family member/caregiver input: Normal    PHQ-2 Score:   PHQ-2 ( 1999 Pfizer) 9/22/2021 2/23/2021   Q1: Little interest or pleasure in doing things 0 0   Q2: Feeling down, depressed or hopeless 0 0   PHQ-2 Score 0 0       PHQ-9 Score:   [facility] Follow-up to PHQ 4/18/2016 8/5/2019   PHQ-9 9. Suicide Ideation past 2 weeks Not at all Not at all       Mini Cog Test:    Recall result:  3 points   Clock Draw Test result: Normal    Cognitive screen is:Negative    Other Assessments:  CV Risk based on Pooled Cohort Risk   The 10-year ASCVD risk score (Albert MODI Jr., et  "al., 2013) is: 13.4%    Values used to calculate the score:      Age: 71 years      Sex: Female      Is Non- : No      Diabetic: No      Tobacco smoker: No      Systolic Blood Pressure: 128 mmHg      Is BP treated: Yes      HDL Cholesterol: 65 mg/dL      Total Cholesterol: 275 mg/dL    ASA use (>3% risk in 5 y):recommend  Repeat lipid: recommended  Breast CA Screenin-2 years 50-74years:  Recommended and patient accepted testing.  Colonoscopy due   ECG (if done)not performed      Advance Directives: Discussed with patient and family as appropriate. Has patient  completed advance directives and/or a living will? Yes-is updating this year    Immunization History   Administered Date(s) Administered     COVID-19,PF,Moderna 2021, 2021     HepA-Adult 2018     Influenza (High Dose) 3 valent vaccine 10/31/2016, 2020     Influenza (IIV3) PF 01/10/2005, 2010, 2011, 2013     Influenza Vaccine IM > 6 months Valent IIV4 (Alfuria,Fluzone) 10/13/2014, 10/23/2015     Influenza, Quad, High Dose, Pf, 65yr+ (Fluzone HD) 2021     Pneumo Conj 13-V (2010&after) 2016     Pneumococcal 23 valent 2019     TD (ADULT, 7+) 2003, 2006     Reviewed Immunization Record Today  Physical Exam    Vitals: /77   Pulse 61   Temp 98.1  F (36.7  C)   Resp 22   Ht 1.46 m (4' 9.48\")   Wt 61.7 kg (136 lb)   SpO2 99%   BMI 28.94 kg/m    BMI= Body mass index is 28.94 kg/m .  EXAM:  GEN: NAD  HEENT: head atraumatic, normocephalic, moist mucous membranes, eyes anicteric  CV: RRR w/o M/R/G  PULM: CTAB  ABD: soft, non-tender, no appreciable masses, no guarding, BS present  Neuro: alert and oriented x 3, CN II-XII intact, normal gross motor movements  Psych: appropriate  Ext: no peripheral edema.  Knee pain with ambulation.  No gross deformity or warmth    Assessment and Plan:  1. Need for prophylactic vaccination and inoculation against influenza  - " INFLUENZA, QUAD, HIGH DOSE, PF, 65YR + (FLUZONE HD)    2. Medicare annual wellness visit, subsequent  - multivitamin (CENTRUM SILVER) tablet; Take 1 tablet by mouth daily  Dispense: 90 tablet; Refill: 3    3. Menopause present    4. At risk for breast cancer  - MA SCREENING DIGITAL BILAT; Future    5. Encounter for screening mammogram for malignant neoplasm of breast   - MA SCREENING DIGITAL BILAT; Future    6. Chronic pain of right knee  - diclofenac (VOLTAREN) 1 % topical gel; Apply 4 g topically 4 times daily  Dispense: 350 g; Refill: 1    7. Hyperlipidemia: patient at intermediate risk for adverse CV outcomes.  Other risk factors SE  race.  No personal hx CV disease or DM.  Discuss statin at next visit.   - Lipid panel; Future    Reviewed Preventive Services and Plan form with patient as specified in  Patient Instructions.  Positive findings on assessment: knee pain  Sona was seen today for wellness visit and imm/inj.    Diagnoses and all orders for this visit:    Need for prophylactic vaccination and inoculation against influenza  -     INFLUENZA, QUAD, HIGH DOSE, PF, 65YR + (FLUZONE HD)        Options for treatment and follow-up care were reviewed with the Sona Y Marilyn  and/or guardian engaged in the decision making process and verbalized  understanding of the options discussed and agreed with the final plan.  Renetta Crowell MD

## 2021-09-22 NOTE — NURSING NOTE
Due to patient being non-English speaking/uses sign language, an  was used for this visit. Only for face-to-face interpretation by an external agency, date and length of interpretation can be found on the scanned worksheet.     name: Mag Gaspar  Agency: [employer]rin Abdullahi  Language: [place]   Telephone number: [phone]  Type of interpretation: Face-to-face, spoken

## 2021-10-07 ENCOUNTER — DOCUMENTATION ONLY (OUTPATIENT)
Dept: FAMILY MEDICINE | Facility: CLINIC | Age: 71
End: 2021-10-07

## 2021-10-12 NOTE — PROGRESS NOTES
Telephone call to the client's home for annual health risk assessment and PCA assessment.  An  was not needed.    Current situation/living environment  Lives with Family    Activities of daily living (ADL)/instrumental activities of daily living (IADL) and functional issues  Client needs help with the following ADL's: none  Client needs help with the following IADL's: shopping, cooking, housekeeping and laundry  Client states she is unable to perform the above due to physical limitations      Health concerns for today  Has patient fallen 2 or more times in the last year? No  Has patient fallen with injury in the last year? No    Cognition/mental health  No concerns reported    STARS/Med Adherence  Client is non-compliant with the following quality measures: Breast cancer screening  Comments: education efforts    Client's Plan of Care consists of:  Adult day care (5 days per week) and Homemaker services (3 hours per week)

## 2021-10-13 ENCOUNTER — MEDICAL CORRESPONDENCE (OUTPATIENT)
Dept: HEALTH INFORMATION MANAGEMENT | Facility: CLINIC | Age: 71
End: 2021-10-13
Payer: COMMERCIAL

## 2021-12-20 ENCOUNTER — TELEPHONE (OUTPATIENT)
Dept: FAMILY MEDICINE | Facility: CLINIC | Age: 71
End: 2021-12-20
Payer: COMMERCIAL

## 2021-12-20 NOTE — TELEPHONE ENCOUNTER
[facility] phone call message - order or referral request for patient:     Order or referral being requested: EYE      Additional Comments:   Patient said she had an appt at an eye clinic but it was too far from where she lives so she had cancelled it. She's asking if the referral could be sent to [facility] instead on [address] [place] [address] instead so she can better explain her situation to a Hmong speaking doctor.    Please note, I reviewed her chart and didn't see any referral for an eye doctor.     OK to leave a message on voice mail? Yes    Primary language: Hmong      needed? Yes    Call taken on December 20, 2021 at 1:40 PM by Na Gaspar

## 2021-12-23 NOTE — TELEPHONE ENCOUNTER
Called spoke with pt and she said saw Nubia at [facility] in [place] office for her annual eye exam. Next step is to do consultation for cataract removal in [place] location. Pt preferred closer location near her home ([place], [place]) due to transportation issue.     Called [facility] for closer locations as patient requested. Per  for pictures of her eyes and surgery only done in [place] office.    Called and informed patient with message. Also informed pt that not sure another referral to Dr. Uribe for annual eye exam will be cover due to insurance only cover once a year. Pt agreed and will try to reschedule for surgery and pictures of her eyes.

## 2022-01-12 ENCOUNTER — ANCILLARY PROCEDURE (OUTPATIENT)
Dept: MAMMOGRAPHY | Facility: CLINIC | Age: 72
End: 2022-01-12
Attending: STUDENT IN AN ORGANIZED HEALTH CARE EDUCATION/TRAINING PROGRAM
Payer: COMMERCIAL

## 2022-01-12 DIAGNOSIS — Z91.89 AT RISK FOR BREAST CANCER: ICD-10-CM

## 2022-01-12 DIAGNOSIS — Z12.31 ENCOUNTER FOR SCREENING MAMMOGRAM FOR MALIGNANT NEOPLASM OF BREAST: ICD-10-CM

## 2022-01-12 PROCEDURE — 77067 SCR MAMMO BI INCL CAD: CPT

## 2022-03-08 ENCOUNTER — MEDICAL CORRESPONDENCE (OUTPATIENT)
Dept: HEALTH INFORMATION MANAGEMENT | Facility: CLINIC | Age: 72
End: 2022-03-08
Payer: COMMERCIAL

## 2022-10-05 ENCOUNTER — TELEPHONE (OUTPATIENT)
Dept: FAMILY MEDICINE | Facility: CLINIC | Age: 72
End: 2022-10-05

## 2022-10-05 NOTE — TELEPHONE ENCOUNTER
Called patient no answer unable to lvmtcb, if patient or Adult day care calls please inform per  patient is needing an appointment before forms can be completed. Copy will be made and placed in call center and original will be placed in the  sina. Please help assist patient with scheduling an appointment.

## 2023-01-25 ENCOUNTER — MEDICAL CORRESPONDENCE (OUTPATIENT)
Dept: HEALTH INFORMATION MANAGEMENT | Facility: CLINIC | Age: 73
End: 2023-01-25
Payer: COMMERCIAL

## 2023-01-30 ENCOUNTER — MEDICAL CORRESPONDENCE (OUTPATIENT)
Dept: HEALTH INFORMATION MANAGEMENT | Facility: CLINIC | Age: 73
End: 2023-01-30
Payer: COMMERCIAL